# Patient Record
Sex: FEMALE | Race: ASIAN | Employment: UNEMPLOYED | URBAN - METROPOLITAN AREA
[De-identification: names, ages, dates, MRNs, and addresses within clinical notes are randomized per-mention and may not be internally consistent; named-entity substitution may affect disease eponyms.]

---

## 2023-02-21 ENCOUNTER — ANESTHESIA (OUTPATIENT)
Dept: SURGERY | Age: 43
DRG: 153 | End: 2023-02-21

## 2023-02-21 ENCOUNTER — APPOINTMENT (OUTPATIENT)
Dept: CT IMAGING | Age: 43
DRG: 153 | End: 2023-02-21

## 2023-02-21 ENCOUNTER — ANESTHESIA EVENT (OUTPATIENT)
Dept: SURGERY | Age: 43
DRG: 153 | End: 2023-02-21

## 2023-02-21 ENCOUNTER — HOSPITAL ENCOUNTER (INPATIENT)
Age: 43
LOS: 2 days | Discharge: HOME | DRG: 153 | End: 2023-02-23
Attending: EMERGENCY MEDICINE | Admitting: STUDENT IN AN ORGANIZED HEALTH CARE EDUCATION/TRAINING PROGRAM

## 2023-02-21 DIAGNOSIS — H70.002: ICD-10-CM

## 2023-02-21 DIAGNOSIS — H70.012: ICD-10-CM

## 2023-02-21 DIAGNOSIS — H70.002 ACUTE MASTOIDITIS OF LEFT SIDE: Primary | ICD-10-CM

## 2023-02-21 DIAGNOSIS — D50.8 IRON DEFICIENCY ANEMIA SECONDARY TO INADEQUATE DIETARY IRON INTAKE: Chronic | ICD-10-CM

## 2023-02-21 LAB
ALBUMIN SERPL-MCNC: 2.9 G/DL (ref 3.5–5)
ALBUMIN/GLOB SERPL: 0.5 (ref 0.4–1.6)
ALP SERPL-CCNC: 305 U/L (ref 50–130)
ALT SERPL-CCNC: 17 U/L (ref 12–65)
ANION GAP SERPL CALC-SCNC: 7 MMOL/L (ref 2–11)
AST SERPL-CCNC: 18 U/L (ref 15–37)
BILIRUB SERPL-MCNC: 0.3 MG/DL (ref 0.2–1.1)
BUN SERPL-MCNC: 5 MG/DL (ref 6–23)
CALCIUM SERPL-MCNC: 8.5 MG/DL (ref 8.3–10.4)
CHLORIDE SERPL-SCNC: 104 MMOL/L (ref 101–110)
CO2 SERPL-SCNC: 25 MMOL/L (ref 21–32)
CREAT SERPL-MCNC: 0.67 MG/DL (ref 0.6–1)
ERYTHROCYTE [DISTWIDTH] IN BLOOD BY AUTOMATED COUNT: 18.6 % (ref 11.9–14.6)
FERRITIN SERPL-MCNC: 3 NG/ML (ref 8–388)
FOLATE SERPL-MCNC: 3.4 NG/ML (ref 3.1–17.5)
GLOBULIN SER CALC-MCNC: 5.8 G/DL (ref 2.8–4.5)
GLUCOSE SERPL-MCNC: 113 MG/DL (ref 65–100)
HCG SERPL QL: NEGATIVE
HCT VFR BLD AUTO: 24.6 % (ref 35.8–46.3)
HCT VFR BLD AUTO: 30.9 % (ref 35.8–46.3)
HGB BLD-MCNC: 6.7 G/DL (ref 11.7–15.4)
HGB BLD-MCNC: 8.8 G/DL (ref 11.7–15.4)
HISTORY CHECK: NORMAL
IRON SATN MFR SERPL: 4 %
IRON SERPL-MCNC: 16 UG/DL (ref 35–150)
MCH RBC QN AUTO: 18.8 PG (ref 26.1–32.9)
MCHC RBC AUTO-ENTMCNC: 27.2 G/DL (ref 31.4–35)
MCV RBC AUTO: 68.9 FL (ref 82–102)
NRBC # BLD: 0 K/UL (ref 0–0.2)
PLATELET # BLD AUTO: 474 K/UL (ref 150–450)
PMV BLD AUTO: 8.6 FL (ref 9.4–12.3)
POTASSIUM SERPL-SCNC: 3.5 MMOL/L (ref 3.5–5.1)
PROT SERPL-MCNC: 8.7 G/DL (ref 6.3–8.2)
RBC # BLD AUTO: 3.57 M/UL (ref 4.05–5.2)
SODIUM SERPL-SCNC: 136 MMOL/L (ref 133–143)
TIBC SERPL-MCNC: 418 UG/DL (ref 250–450)
VIT B12 SERPL-MCNC: 306 PG/ML (ref 193–986)
WBC # BLD AUTO: 10.7 K/UL (ref 4.3–11.1)

## 2023-02-21 PROCEDURE — P9016 RBC LEUKOCYTES REDUCED: HCPCS

## 2023-02-21 PROCEDURE — 87077 CULTURE AEROBIC IDENTIFY: CPT

## 2023-02-21 PROCEDURE — 6360000004 HC RX CONTRAST MEDICATION: Performed by: EMERGENCY MEDICINE

## 2023-02-21 PROCEDURE — 88304 TISSUE EXAM BY PATHOLOGIST: CPT

## 2023-02-21 PROCEDURE — 6370000000 HC RX 637 (ALT 250 FOR IP): Performed by: OTOLARYNGOLOGY

## 2023-02-21 PROCEDURE — 2500000003 HC RX 250 WO HCPCS: Performed by: OTOLARYNGOLOGY

## 2023-02-21 PROCEDURE — 09B1XZX EXCISION OF LEFT EXTERNAL EAR, EXTERNAL APPROACH, DIAGNOSTIC: ICD-10-PCS | Performed by: OTOLARYNGOLOGY

## 2023-02-21 PROCEDURE — 2580000003 HC RX 258: Performed by: STUDENT IN AN ORGANIZED HEALTH CARE EDUCATION/TRAINING PROGRAM

## 2023-02-21 PROCEDURE — 3700000001 HC ADD 15 MINUTES (ANESTHESIA): Performed by: OTOLARYNGOLOGY

## 2023-02-21 PROCEDURE — 7100000000 HC PACU RECOVERY - FIRST 15 MIN: Performed by: OTOLARYNGOLOGY

## 2023-02-21 PROCEDURE — 2580000003 HC RX 258: Performed by: ANESTHESIOLOGY

## 2023-02-21 PROCEDURE — 88305 TISSUE EXAM BY PATHOLOGIST: CPT

## 2023-02-21 PROCEDURE — 09960ZZ DRAINAGE OF LEFT MIDDLE EAR, OPEN APPROACH: ICD-10-PCS | Performed by: OTOLARYNGOLOGY

## 2023-02-21 PROCEDURE — 82746 ASSAY OF FOLIC ACID SERUM: CPT

## 2023-02-21 PROCEDURE — 69005 DRG XTRNL EAR ABSC/HEM COMP: CPT | Performed by: OTOLARYNGOLOGY

## 2023-02-21 PROCEDURE — 87075 CULTR BACTERIA EXCEPT BLOOD: CPT

## 2023-02-21 PROCEDURE — 85027 COMPLETE CBC AUTOMATED: CPT

## 2023-02-21 PROCEDURE — 99222 1ST HOSP IP/OBS MODERATE 55: CPT | Performed by: OTOLARYNGOLOGY

## 2023-02-21 PROCEDURE — 2580000003 HC RX 258: Performed by: NURSE ANESTHETIST, CERTIFIED REGISTERED

## 2023-02-21 PROCEDURE — 3600000002 HC SURGERY LEVEL 2 BASE: Performed by: OTOLARYNGOLOGY

## 2023-02-21 PROCEDURE — 69420 INCISION OF EARDRUM: CPT | Performed by: OTOLARYNGOLOGY

## 2023-02-21 PROCEDURE — 6360000002 HC RX W HCPCS: Performed by: EMERGENCY MEDICINE

## 2023-02-21 PROCEDURE — 1100000000 HC RM PRIVATE

## 2023-02-21 PROCEDURE — 3700000000 HC ANESTHESIA ATTENDED CARE: Performed by: OTOLARYNGOLOGY

## 2023-02-21 PROCEDURE — 6360000002 HC RX W HCPCS: Performed by: STUDENT IN AN ORGANIZED HEALTH CARE EDUCATION/TRAINING PROGRAM

## 2023-02-21 PROCEDURE — 7100000001 HC PACU RECOVERY - ADDTL 15 MIN: Performed by: OTOLARYNGOLOGY

## 2023-02-21 PROCEDURE — 3600000012 HC SURGERY LEVEL 2 ADDTL 15MIN: Performed by: OTOLARYNGOLOGY

## 2023-02-21 PROCEDURE — 2500000003 HC RX 250 WO HCPCS: Performed by: NURSE ANESTHETIST, CERTIFIED REGISTERED

## 2023-02-21 PROCEDURE — 83550 IRON BINDING TEST: CPT

## 2023-02-21 PROCEDURE — 85018 HEMOGLOBIN: CPT

## 2023-02-21 PROCEDURE — 6360000002 HC RX W HCPCS: Performed by: NURSE ANESTHETIST, CERTIFIED REGISTERED

## 2023-02-21 PROCEDURE — 96366 THER/PROPH/DIAG IV INF ADDON: CPT

## 2023-02-21 PROCEDURE — 87186 SC STD MICRODIL/AGAR DIL: CPT

## 2023-02-21 PROCEDURE — 96375 TX/PRO/DX INJ NEW DRUG ADDON: CPT

## 2023-02-21 PROCEDURE — 2709999900 HC NON-CHARGEABLE SUPPLY: Performed by: OTOLARYNGOLOGY

## 2023-02-21 PROCEDURE — 84703 CHORIONIC GONADOTROPIN ASSAY: CPT

## 2023-02-21 PROCEDURE — 80053 COMPREHEN METABOLIC PANEL: CPT

## 2023-02-21 PROCEDURE — 87205 SMEAR GRAM STAIN: CPT

## 2023-02-21 PROCEDURE — 09910ZZ DRAINAGE OF LEFT EXTERNAL EAR, OPEN APPROACH: ICD-10-PCS | Performed by: OTOLARYNGOLOGY

## 2023-02-21 PROCEDURE — 2580000003 HC RX 258: Performed by: EMERGENCY MEDICINE

## 2023-02-21 PROCEDURE — 30233N1 TRANSFUSION OF NONAUTOLOGOUS RED BLOOD CELLS INTO PERIPHERAL VEIN, PERCUTANEOUS APPROACH: ICD-10-PCS | Performed by: STUDENT IN AN ORGANIZED HEALTH CARE EDUCATION/TRAINING PROGRAM

## 2023-02-21 PROCEDURE — 86923 COMPATIBILITY TEST ELECTRIC: CPT

## 2023-02-21 PROCEDURE — 82607 VITAMIN B-12: CPT

## 2023-02-21 PROCEDURE — 96367 TX/PROPH/DG ADDL SEQ IV INF: CPT

## 2023-02-21 PROCEDURE — 6360000002 HC RX W HCPCS: Performed by: ANESTHESIOLOGY

## 2023-02-21 PROCEDURE — 82728 ASSAY OF FERRITIN: CPT

## 2023-02-21 PROCEDURE — 86901 BLOOD TYPING SEROLOGIC RH(D): CPT

## 2023-02-21 PROCEDURE — 70487 CT MAXILLOFACIAL W/DYE: CPT

## 2023-02-21 PROCEDURE — 96365 THER/PROPH/DIAG IV INF INIT: CPT

## 2023-02-21 PROCEDURE — 99285 EMERGENCY DEPT VISIT HI MDM: CPT

## 2023-02-21 PROCEDURE — 87040 BLOOD CULTURE FOR BACTERIA: CPT

## 2023-02-21 RX ORDER — LIDOCAINE HYDROCHLORIDE AND EPINEPHRINE 10; 10 MG/ML; UG/ML
INJECTION, SOLUTION INFILTRATION; PERINEURAL PRN
Status: DISCONTINUED | OUTPATIENT
Start: 2023-02-21 | End: 2023-02-21 | Stop reason: HOSPADM

## 2023-02-21 RX ORDER — DIPHENHYDRAMINE HYDROCHLORIDE 50 MG/ML
12.5 INJECTION INTRAMUSCULAR; INTRAVENOUS
Status: DISCONTINUED | OUTPATIENT
Start: 2023-02-21 | End: 2023-02-21 | Stop reason: HOSPADM

## 2023-02-21 RX ORDER — POTASSIUM CHLORIDE 7.45 MG/ML
10 INJECTION INTRAVENOUS PRN
Status: DISCONTINUED | OUTPATIENT
Start: 2023-02-21 | End: 2023-02-23 | Stop reason: HOSPADM

## 2023-02-21 RX ORDER — LIDOCAINE HYDROCHLORIDE 10 MG/ML
1 INJECTION, SOLUTION INFILTRATION; PERINEURAL
Status: DISCONTINUED | OUTPATIENT
Start: 2023-02-21 | End: 2023-02-21

## 2023-02-21 RX ORDER — SODIUM CHLORIDE 9 MG/ML
INJECTION, SOLUTION INTRAVENOUS PRN
Status: DISCONTINUED | OUTPATIENT
Start: 2023-02-21 | End: 2023-02-21 | Stop reason: HOSPADM

## 2023-02-21 RX ORDER — DEXAMETHASONE SODIUM PHOSPHATE 10 MG/ML
INJECTION INTRAMUSCULAR; INTRAVENOUS PRN
Status: DISCONTINUED | OUTPATIENT
Start: 2023-02-21 | End: 2023-02-21 | Stop reason: SDUPTHER

## 2023-02-21 RX ORDER — SODIUM CHLORIDE 9 MG/ML
INJECTION, SOLUTION INTRAVENOUS PRN
Status: DISCONTINUED | OUTPATIENT
Start: 2023-02-21 | End: 2023-02-23 | Stop reason: HOSPADM

## 2023-02-21 RX ORDER — PROPOFOL 10 MG/ML
INJECTION, EMULSION INTRAVENOUS PRN
Status: DISCONTINUED | OUTPATIENT
Start: 2023-02-21 | End: 2023-02-21 | Stop reason: SDUPTHER

## 2023-02-21 RX ORDER — POTASSIUM CHLORIDE 20 MEQ/1
40 TABLET, EXTENDED RELEASE ORAL PRN
Status: DISCONTINUED | OUTPATIENT
Start: 2023-02-21 | End: 2023-02-23 | Stop reason: HOSPADM

## 2023-02-21 RX ORDER — ONDANSETRON 4 MG/1
4 TABLET, ORALLY DISINTEGRATING ORAL EVERY 8 HOURS PRN
Status: DISCONTINUED | OUTPATIENT
Start: 2023-02-21 | End: 2023-02-23 | Stop reason: HOSPADM

## 2023-02-21 RX ORDER — MIDAZOLAM HYDROCHLORIDE 1 MG/ML
2 INJECTION INTRAMUSCULAR; INTRAVENOUS
Status: DISCONTINUED | OUTPATIENT
Start: 2023-02-21 | End: 2023-02-21

## 2023-02-21 RX ORDER — SODIUM CHLORIDE 0.9 % (FLUSH) 0.9 %
5-40 SYRINGE (ML) INJECTION PRN
Status: DISCONTINUED | OUTPATIENT
Start: 2023-02-21 | End: 2023-02-23 | Stop reason: HOSPADM

## 2023-02-21 RX ORDER — EPINEPHRINE NASAL SOLUTION 1 MG/ML
SOLUTION NASAL PRN
Status: DISCONTINUED | OUTPATIENT
Start: 2023-02-21 | End: 2023-02-21 | Stop reason: HOSPADM

## 2023-02-21 RX ORDER — ROCURONIUM BROMIDE 10 MG/ML
INJECTION, SOLUTION INTRAVENOUS PRN
Status: DISCONTINUED | OUTPATIENT
Start: 2023-02-21 | End: 2023-02-21 | Stop reason: SDUPTHER

## 2023-02-21 RX ORDER — PROCHLORPERAZINE EDISYLATE 5 MG/ML
5 INJECTION INTRAMUSCULAR; INTRAVENOUS
Status: DISCONTINUED | OUTPATIENT
Start: 2023-02-21 | End: 2023-02-21 | Stop reason: HOSPADM

## 2023-02-21 RX ORDER — 0.9 % SODIUM CHLORIDE 0.9 %
100 INTRAVENOUS SOLUTION INTRAVENOUS
Status: COMPLETED | OUTPATIENT
Start: 2023-02-21 | End: 2023-02-21

## 2023-02-21 RX ORDER — KETOROLAC TROMETHAMINE 15 MG/ML
15 INJECTION, SOLUTION INTRAMUSCULAR; INTRAVENOUS
Status: COMPLETED | OUTPATIENT
Start: 2023-02-21 | End: 2023-02-21

## 2023-02-21 RX ORDER — ONDANSETRON 2 MG/ML
INJECTION INTRAMUSCULAR; INTRAVENOUS PRN
Status: DISCONTINUED | OUTPATIENT
Start: 2023-02-21 | End: 2023-02-21 | Stop reason: SDUPTHER

## 2023-02-21 RX ORDER — ONDANSETRON 2 MG/ML
4 INJECTION INTRAMUSCULAR; INTRAVENOUS
Status: DISCONTINUED | OUTPATIENT
Start: 2023-02-21 | End: 2023-02-21 | Stop reason: HOSPADM

## 2023-02-21 RX ORDER — ACETAMINOPHEN 500 MG
1000 TABLET ORAL ONCE
Status: DISCONTINUED | OUTPATIENT
Start: 2023-02-21 | End: 2023-02-21

## 2023-02-21 RX ORDER — ACETAMINOPHEN 325 MG/1
650 TABLET ORAL EVERY 6 HOURS PRN
Status: DISCONTINUED | OUTPATIENT
Start: 2023-02-21 | End: 2023-02-23 | Stop reason: HOSPADM

## 2023-02-21 RX ORDER — SODIUM CHLORIDE, SODIUM LACTATE, POTASSIUM CHLORIDE, CALCIUM CHLORIDE 600; 310; 30; 20 MG/100ML; MG/100ML; MG/100ML; MG/100ML
INJECTION, SOLUTION INTRAVENOUS CONTINUOUS
Status: DISCONTINUED | OUTPATIENT
Start: 2023-02-21 | End: 2023-02-21

## 2023-02-21 RX ORDER — MORPHINE SULFATE 2 MG/ML
2 INJECTION, SOLUTION INTRAMUSCULAR; INTRAVENOUS EVERY 4 HOURS PRN
Status: DISCONTINUED | OUTPATIENT
Start: 2023-02-21 | End: 2023-02-23 | Stop reason: HOSPADM

## 2023-02-21 RX ORDER — POLYETHYLENE GLYCOL 3350 17 G/17G
17 POWDER, FOR SOLUTION ORAL DAILY PRN
Status: DISCONTINUED | OUTPATIENT
Start: 2023-02-21 | End: 2023-02-23 | Stop reason: HOSPADM

## 2023-02-21 RX ORDER — NEOSTIGMINE METHYLSULFATE 1 MG/ML
INJECTION, SOLUTION INTRAVENOUS PRN
Status: DISCONTINUED | OUTPATIENT
Start: 2023-02-21 | End: 2023-02-21 | Stop reason: SDUPTHER

## 2023-02-21 RX ORDER — ONDANSETRON 2 MG/ML
4 INJECTION INTRAMUSCULAR; INTRAVENOUS EVERY 6 HOURS PRN
Status: DISCONTINUED | OUTPATIENT
Start: 2023-02-21 | End: 2023-02-23 | Stop reason: HOSPADM

## 2023-02-21 RX ORDER — SODIUM CHLORIDE 0.9 % (FLUSH) 0.9 %
10 SYRINGE (ML) INJECTION
Status: COMPLETED | OUTPATIENT
Start: 2023-02-21 | End: 2023-02-21

## 2023-02-21 RX ORDER — SODIUM CHLORIDE 0.9 % (FLUSH) 0.9 %
5-40 SYRINGE (ML) INJECTION PRN
Status: DISCONTINUED | OUTPATIENT
Start: 2023-02-21 | End: 2023-02-21 | Stop reason: HOSPADM

## 2023-02-21 RX ORDER — SODIUM CHLORIDE, SODIUM LACTATE, POTASSIUM CHLORIDE, CALCIUM CHLORIDE 600; 310; 30; 20 MG/100ML; MG/100ML; MG/100ML; MG/100ML
INJECTION, SOLUTION INTRAVENOUS CONTINUOUS
Status: DISCONTINUED | OUTPATIENT
Start: 2023-02-21 | End: 2023-02-23

## 2023-02-21 RX ORDER — DIPHENHYDRAMINE HYDROCHLORIDE 50 MG/ML
12.5 INJECTION INTRAMUSCULAR; INTRAVENOUS EVERY 6 HOURS PRN
Status: DISCONTINUED | OUTPATIENT
Start: 2023-02-21 | End: 2023-02-23 | Stop reason: HOSPADM

## 2023-02-21 RX ORDER — MAGNESIUM SULFATE IN WATER 40 MG/ML
2000 INJECTION, SOLUTION INTRAVENOUS PRN
Status: DISCONTINUED | OUTPATIENT
Start: 2023-02-21 | End: 2023-02-23 | Stop reason: HOSPADM

## 2023-02-21 RX ORDER — OXYCODONE HYDROCHLORIDE 5 MG/1
10 TABLET ORAL PRN
Status: DISCONTINUED | OUTPATIENT
Start: 2023-02-21 | End: 2023-02-21 | Stop reason: HOSPADM

## 2023-02-21 RX ORDER — SODIUM CHLORIDE 0.9 % (FLUSH) 0.9 %
5-40 SYRINGE (ML) INJECTION EVERY 12 HOURS SCHEDULED
Status: DISCONTINUED | OUTPATIENT
Start: 2023-02-21 | End: 2023-02-23 | Stop reason: HOSPADM

## 2023-02-21 RX ORDER — GLYCOPYRROLATE 0.2 MG/ML
INJECTION INTRAMUSCULAR; INTRAVENOUS PRN
Status: DISCONTINUED | OUTPATIENT
Start: 2023-02-21 | End: 2023-02-21 | Stop reason: SDUPTHER

## 2023-02-21 RX ORDER — SODIUM CHLORIDE 0.9 % (FLUSH) 0.9 %
5-40 SYRINGE (ML) INJECTION PRN
Status: DISCONTINUED | OUTPATIENT
Start: 2023-02-21 | End: 2023-02-21

## 2023-02-21 RX ORDER — FENTANYL CITRATE 50 UG/ML
INJECTION, SOLUTION INTRAMUSCULAR; INTRAVENOUS PRN
Status: DISCONTINUED | OUTPATIENT
Start: 2023-02-21 | End: 2023-02-21 | Stop reason: SDUPTHER

## 2023-02-21 RX ORDER — SODIUM CHLORIDE 0.9 % (FLUSH) 0.9 %
5-40 SYRINGE (ML) INJECTION EVERY 12 HOURS SCHEDULED
Status: DISCONTINUED | OUTPATIENT
Start: 2023-02-21 | End: 2023-02-21 | Stop reason: HOSPADM

## 2023-02-21 RX ORDER — OXYCODONE HYDROCHLORIDE 5 MG/1
5 TABLET ORAL PRN
Status: DISCONTINUED | OUTPATIENT
Start: 2023-02-21 | End: 2023-02-21 | Stop reason: HOSPADM

## 2023-02-21 RX ORDER — ACETAMINOPHEN 650 MG/1
650 SUPPOSITORY RECTAL EVERY 6 HOURS PRN
Status: DISCONTINUED | OUTPATIENT
Start: 2023-02-21 | End: 2023-02-23 | Stop reason: HOSPADM

## 2023-02-21 RX ORDER — LIDOCAINE HYDROCHLORIDE 20 MG/ML
INJECTION, SOLUTION EPIDURAL; INFILTRATION; INTRACAUDAL; PERINEURAL PRN
Status: DISCONTINUED | OUTPATIENT
Start: 2023-02-21 | End: 2023-02-21 | Stop reason: SDUPTHER

## 2023-02-21 RX ORDER — SODIUM CHLORIDE, SODIUM LACTATE, POTASSIUM CHLORIDE, CALCIUM CHLORIDE 600; 310; 30; 20 MG/100ML; MG/100ML; MG/100ML; MG/100ML
INJECTION, SOLUTION INTRAVENOUS CONTINUOUS PRN
Status: DISCONTINUED | OUTPATIENT
Start: 2023-02-21 | End: 2023-02-21 | Stop reason: SDUPTHER

## 2023-02-21 RX ORDER — SODIUM CHLORIDE 9 MG/ML
INJECTION, SOLUTION INTRAVENOUS PRN
Status: DISCONTINUED | OUTPATIENT
Start: 2023-02-21 | End: 2023-02-21

## 2023-02-21 RX ORDER — SODIUM CHLORIDE 0.9 % (FLUSH) 0.9 %
5-40 SYRINGE (ML) INJECTION EVERY 12 HOURS SCHEDULED
Status: DISCONTINUED | OUTPATIENT
Start: 2023-02-21 | End: 2023-02-21

## 2023-02-21 RX ADMIN — DEXAMETHASONE SODIUM PHOSPHATE 5 MG: 10 INJECTION INTRAMUSCULAR; INTRAVENOUS at 17:27

## 2023-02-21 RX ADMIN — SODIUM CHLORIDE, PRESERVATIVE FREE 10 ML: 5 INJECTION INTRAVENOUS at 09:11

## 2023-02-21 RX ADMIN — ONDANSETRON 4 MG: 2 INJECTION INTRAMUSCULAR; INTRAVENOUS at 17:27

## 2023-02-21 RX ADMIN — FENTANYL CITRATE 50 MCG: 50 INJECTION, SOLUTION INTRAMUSCULAR; INTRAVENOUS at 17:33

## 2023-02-21 RX ADMIN — KETOROLAC TROMETHAMINE 15 MG: 15 INJECTION, SOLUTION INTRAMUSCULAR; INTRAVENOUS at 07:34

## 2023-02-21 RX ADMIN — VANCOMYCIN HYDROCHLORIDE 1500 MG: 10 INJECTION, POWDER, LYOPHILIZED, FOR SOLUTION INTRAVENOUS at 08:43

## 2023-02-21 RX ADMIN — GLYCOPYRROLATE 0.4 MG: 0.2 INJECTION INTRAMUSCULAR; INTRAVENOUS at 18:11

## 2023-02-21 RX ADMIN — ROCURONIUM BROMIDE 35 MG: 50 INJECTION, SOLUTION INTRAVENOUS at 17:10

## 2023-02-21 RX ADMIN — SODIUM CHLORIDE 100 ML: 9 INJECTION, SOLUTION INTRAVENOUS at 09:11

## 2023-02-21 RX ADMIN — FENTANYL CITRATE 50 MCG: 50 INJECTION, SOLUTION INTRAMUSCULAR; INTRAVENOUS at 17:10

## 2023-02-21 RX ADMIN — VANCOMYCIN HYDROCHLORIDE 1000 MG: 1 INJECTION, POWDER, LYOPHILIZED, FOR SOLUTION INTRAVENOUS at 21:36

## 2023-02-21 RX ADMIN — PIPERACILLIN AND TAZOBACTAM 4500 MG: 4; .5 INJECTION, POWDER, FOR SOLUTION INTRAVENOUS at 07:44

## 2023-02-21 RX ADMIN — HYDROMORPHONE HYDROCHLORIDE 0.5 MG: 1 INJECTION, SOLUTION INTRAMUSCULAR; INTRAVENOUS; SUBCUTANEOUS at 18:56

## 2023-02-21 RX ADMIN — SODIUM CHLORIDE, PRESERVATIVE FREE 10 ML: 5 INJECTION INTRAVENOUS at 21:42

## 2023-02-21 RX ADMIN — PROPOFOL 200 MG: 10 INJECTION, EMULSION INTRAVENOUS at 17:10

## 2023-02-21 RX ADMIN — SODIUM CHLORIDE, SODIUM LACTATE, POTASSIUM CHLORIDE, AND CALCIUM CHLORIDE: 600; 310; 30; 20 INJECTION, SOLUTION INTRAVENOUS at 17:05

## 2023-02-21 RX ADMIN — LIDOCAINE HYDROCHLORIDE 80 MG: 20 INJECTION, SOLUTION EPIDURAL; INFILTRATION; INTRACAUDAL; PERINEURAL at 17:10

## 2023-02-21 RX ADMIN — ROCURONIUM BROMIDE 5 MG: 50 INJECTION, SOLUTION INTRAVENOUS at 17:33

## 2023-02-21 RX ADMIN — IOPAMIDOL 100 ML: 755 INJECTION, SOLUTION INTRAVENOUS at 09:11

## 2023-02-21 RX ADMIN — MORPHINE SULFATE 2 MG: 2 INJECTION, SOLUTION INTRAMUSCULAR; INTRAVENOUS at 12:30

## 2023-02-21 RX ADMIN — SODIUM CHLORIDE, POTASSIUM CHLORIDE, SODIUM LACTATE AND CALCIUM CHLORIDE: 600; 310; 30; 20 INJECTION, SOLUTION INTRAVENOUS at 21:42

## 2023-02-21 RX ADMIN — Medication 3 MG: at 18:11

## 2023-02-21 RX ADMIN — CEFEPIME 2000 MG: 2 INJECTION, POWDER, FOR SOLUTION INTRAVENOUS at 12:08

## 2023-02-21 ASSESSMENT — PAIN SCALES - GENERAL
PAINLEVEL_OUTOF10: 0
PAINLEVEL_OUTOF10: 5
PAINLEVEL_OUTOF10: 0
PAINLEVEL_OUTOF10: 4
PAINLEVEL_OUTOF10: 0
PAINLEVEL_OUTOF10: 8
PAINLEVEL_OUTOF10: 8
PAINLEVEL_OUTOF10: 0
PAINLEVEL_OUTOF10: 0
PAINLEVEL_OUTOF10: 8
PAINLEVEL_OUTOF10: 0

## 2023-02-21 ASSESSMENT — ENCOUNTER SYMPTOMS
VOMITING: 0
RHINORRHEA: 0
NAUSEA: 0
ABDOMINAL PAIN: 0
SHORTNESS OF BREATH: 0
COLOR CHANGE: 1
COUGH: 0
SORE THROAT: 0

## 2023-02-21 ASSESSMENT — PAIN DESCRIPTION - LOCATION
LOCATION: EAR
LOCATION: EAR;INCISION

## 2023-02-21 ASSESSMENT — LIFESTYLE VARIABLES: SMOKING_STATUS: 0

## 2023-02-21 ASSESSMENT — PAIN DESCRIPTION - ORIENTATION: ORIENTATION: LEFT

## 2023-02-21 ASSESSMENT — PAIN - FUNCTIONAL ASSESSMENT: PAIN_FUNCTIONAL_ASSESSMENT: 0-10

## 2023-02-21 NOTE — BRIEF OP NOTE
Brief Postoperative Note      Patient: Akila Fields  YOB: 1980  MRN: [id number]    Date of Procedure: 2/21/2023    Pre-Op Diagnosis: Abcess of the left mastoid, L OME    Post-Op Diagnosis: Same       Procedure(s):  INCISION AND DRAINAGE LEFT MASTOID ABCESS/ POSS MYRINGOTOMY TUBES    Surgeon(s):  Lucien Ferro MD    Assistant:  * No surgical staff found *    Anesthesia: General    Estimated Blood Loss (mL): Minimal    Complications: None    Specimens:   ID Type Source Tests Collected by Time Destination   1 : left ear abscess Swab Ear CULTURE, ANAEROBIC AND AEROBIC Lucien Ferro MD 2/21/2023 1739    A : left ear mass Tissue Ear SURGICAL PATHOLOGY Lucien Ferro MD 2/21/2023 1742        Implants:  * No implants in log *      Drains:   Open Drain 02/21/23 Left (Active)       Findings: thickened purulence within subperiosteal abscess of L mastoid bone, thick ISMAEL w/ inflamed TM- EAC packed w/ Gelfoam    Electronically signed by Lucien Ferro MD on 2/21/2023 at 6:22 PM

## 2023-02-21 NOTE — H&P
Hospitalist History and Physical   Admit Date:  2023  6:50 AM   Name:  Heber Farfan   Age:  43 y.o. Sex:  female  :  1980   MRN:  [id number]   Room:  [facility]    Presenting Complaint: Otalgia     Reason(s) for Admission: Subperiosteal abscess of left mastoid [H70.012]  Acute mastoiditis of left side [H70.002]     History of Present Illness:   Heber Farfan is a 43 y.o. female with medical history of anemia who presented with left ear pain and swelling of her scalp. Patient states her symptoms started about 1 week ago. Denied any upper respiratory symptoms. Denied any drainage from that ear. Denied any cardiac chest pain, shortness of breath, abdominal pain, fever/chills. Patient stated that pain is sharp and without radiation but behind the left ear. Patient speaks English as her second language. CT scan with evidence of left mastoiditis. ENT made aware in the ED. Patient was made NPO. Patient also found to have hemoglobin at 6.7. Patient also with iron deficiency anemia with low iron and ferritin levels. Patient will receive 1 unit packed red blood cell. Assessment & Plan:   Acute mastoiditis of left side  -ENT consulted  - Continue to keep n.p.o.  - Analgesics as needed  - Antibiotics, Vanco plus cefepime  - Patient expected to go to the OR today   - Follow-up ENT recommendations  - CT maxillofacial with acute mastoiditis and subperiosteal abscess    Iron deficiency anemia  - Hemoglobin is 6.7  - Iron and ferritin levels low  - History of transfusions 9 years ago per patient  - Patient would benefit from hematology appointment in outpatient setting  - Status post 1 unit packed red blood cell  - Follow-up daily CBC      PT/OT evals and PPD needed/ordered? Yes    Diet: Diet NPO  VTE prophylaxis: SCD's   Code status: No Order    Hospital Problems:  Principal Problem:    Acute mastoiditis of left side  Resolved Problems:    * No resolved hospital problems.  *       Past History: History reviewed. No pertinent past medical history. Past Surgical History:   Procedure Laterality Date     SECTION      TUBAL LIGATION          Social History     Tobacco Use    Smoking status: Not on file    Smokeless tobacco: Not on file   Substance Use Topics    Alcohol use: Not on file      Social History     Substance and Sexual Activity   Drug Use Not on file       History reviewed. No pertinent family history. There is no immunization history on file for this patient. No Known Allergies  Prior to Admit Medications:  No current outpatient medications      Objective:   Patient Vitals for the past 24 hrs:   Temp Pulse Resp BP SpO2   23 1500 -- -- -- 100/76 100 %   23 1459 98.6 °F (37 °C) 78 16 98/77 97 %   23 1457 -- -- -- 98/77 100 %   23 1400 -- -- -- 96/67 99 %   23 1247 97.9 °F (36.6 °C) 75 16 104/73 100 %   23 1227 98.2 °F (36.8 °C) 72 16 103/72 100 %   23 1200 -- -- -- 108/76 100 %   23 1100 -- -- -- 99/68 100 %   23 1001 -- -- -- 92/71 98 %   23 0648 98.5 °F (36.9 °C) 90 20 (!) 105/56 100 %       Oxygen Therapy  SpO2: 100 %    Estimated body mass index is 25.78 kg/m² as calculated from the following:    Height as of this encounter: 5' (1.524 m). Weight as of this encounter: 132 lb (59.9 kg). No intake or output data in the 24 hours ending 23 1720      Physical Exam:  Blood pressure 100/76, pulse 78, temperature 98.6 °F (37 °C), resp. rate 16, height 5' (1.524 m), weight 132 lb (59.9 kg), SpO2 100 %. General:    Well nourished. Head:  Normocephalic, atraumatic. Left ear slightly swollen but not tender. Mastoid areas are tender  Eyes:  Sclerae appear normal.  Pupils equally round. ENT:  Nares appear normal.  Moist oral mucosa  Neck:  No restricted ROM. Trachea midline   CV:   RRR. No m/r/g. No jugular venous distension. Lungs:   CTAB. No wheezing, rhonchi, or rales. Symmetric expansion.   Abdomen: Soft, nontender, nondistended. Extremities: No cyanosis or clubbing. No edema  Skin:     No rashes and normal coloration. Warm and dry. Neuro:  CN II-XII grossly intact. Sensation intact. Psych:  Normal mood and affect.       I have personally reviewed labs and tests:  Recent Labs:  Recent Results (from the past 24 hour(s))   CBC    Collection Time: 02/21/23  7:33 AM   Result Value Ref Range    WBC 10.7 4.3 - 11.1 K/uL    RBC 3.57 (L) 4.05 - 5.2 M/uL    Hemoglobin 6.7 (LL) 11.7 - 15.4 g/dL    Hematocrit 24.6 (L) 35.8 - 46.3 %    MCV 68.9 (L) 82.0 - 102.0 FL    MCH 18.8 (L) 26.1 - 32.9 PG    MCHC 27.2 (L) 31.4 - 35.0 g/dL    RDW 18.6 (H) 11.9 - 14.6 %    Platelets 126 (H) 263 - 450 K/uL    MPV 8.6 (L) 9.4 - 12.3 FL    nRBC 0.00 0.0 - 0.2 K/uL   Comprehensive Metabolic Panel    Collection Time: 02/21/23  7:33 AM   Result Value Ref Range    Sodium 136 133 - 143 mmol/L    Potassium 3.5 3.5 - 5.1 mmol/L    Chloride 104 101 - 110 mmol/L    CO2 25 21 - 32 mmol/L    Anion Gap 7 2 - 11 mmol/L    Glucose 113 (H) 65 - 100 mg/dL    BUN 5 (L) 6 - 23 MG/DL    Creatinine 0.67 0.6 - 1.0 MG/DL    Est, Glom Filt Rate >60 >60 ml/min/1.73m2    Calcium 8.5 8.3 - 10.4 MG/DL    Total Bilirubin 0.3 0.2 - 1.1 MG/DL    ALT 17 12 - 65 U/L    AST 18 15 - 37 U/L    Alk Phosphatase 305 (H) 50 - 130 U/L    Total Protein 8.7 (H) 6.3 - 8.2 g/dL    Albumin 2.9 (L) 3.5 - 5.0 g/dL    Globulin 5.8 (H) 2.8 - 4.5 g/dL    Albumin/Globulin Ratio 0.5 0.4 - 1.6     Ferritin    Collection Time: 02/21/23  7:33 AM   Result Value Ref Range    Ferritin 3 (L) 8 - 388 NG/ML   Folate    Collection Time: 02/21/23  7:33 AM   Result Value Ref Range    Folate 3.4 3.1 - 17.5 ng/mL   Vitamin B12    Collection Time: 02/21/23  7:33 AM   Result Value Ref Range    Vitamin B-12 306 193 - 986 pg/mL   Transferrin Saturation    Collection Time: 02/21/23  7:33 AM   Result Value Ref Range    Iron 16 (L) 35 - 150 ug/dL    TIBC 418 250 - 450 ug/dL    TRANSFERRIN SATURATION 4 %   TYPE AND SCREEN    Collection Time: 02/21/23  8:21 AM   Result Value Ref Range    Crossmatch expiration date 02/24/2023,2359     ABO/Rh O POSITIVE     Antibody Screen NEG     Blood Bank Comment       VINCE NOTIFIED IN ER THAT UNIT IS READY 2/21/23 @ 11:54 BY ALINA    Unit Number [id number]     Product Code Blood Bank RC LR     Unit Divison 00     Dispense Status Blood Bank ISSUED     Crossmatch Result Compatible    PREPARE RBC (CROSSMATCH), 1 Units    Collection Time: 02/21/23 11:45 AM   Result Value Ref Range    History Check Historical check performed    Hemoglobin and Hematocrit    Collection Time: 02/21/23  4:17 PM   Result Value Ref Range    Hemoglobin 8.8 (L) 11.7 - 15.4 g/dL    Hematocrit 30.9 (L) 35.8 - 46.3 %       I have personally reviewed imaging studies:  CT MAXILLOFACIAL W CONTRAST    Result Date: 2/21/2023  EXAMINATION: CT MAXILLOFACIAL W CONTRAST 2/21/2023 9:19 AM ACCESSION NUMBER: [id number] COMPARISON: None available INDICATION: left ear/facial pain-abscess? Mastoiditis? TECHNIQUE: Dedicated contrast-enhanced CT of the face was performed with axial images. Reformats are provided. 100mL of Isovue-370 was administered intravenously without complication. Radiation dose reduction techniques were used for this study. Our CT scanners use one or all of the following: Automated exposure control, adjustment of the mA and/or kV according to patient size, iterative reconstruction. FINDINGS: BONES: No acute fracture. There is fluid within the left mastoid air cells. 1.9 x 0.5 cm subperiosteal collection along the left mastoid bone. SINUSES: Paranasal sinuses are clear. ORBITS: No evidence of globe hemorrhage or lens dislocation. No radiopaque foreign body. VISUALIZED BRAIN: No visualized hemorrhage, mass effect, or herniation.  SOFT TISSUES: Soft tissue stranding along the left mastoid region and posterior to the ear canal.     1.  CT findings suggestive of left mastoiditis with subperiosteal abscess along the outer table of the left mastoid bone. Soft tissue swelling along the left mastoid region. 2.  No intracranial findings are evident. Echocardiogram:  No results found for this or any previous visit. Orders Placed This Encounter   Medications    ketorolac (TORADOL) injection 15 mg    piperacillin-tazobactam (ZOSYN) 4,500 mg in sodium chloride 0.9 % 100 mL IVPB (mini-bag)     Order Specific Question:   Antimicrobial Indications     Answer: Other     Order Specific Question:   Other Abx Indication     Answer: Suspected Sepsis of Skin or Soft Tissue Origin    vancomycin (VANCOCIN) 1500 mg in sodium chloride 0.9% 500 mL IVPB     Order Specific Question:   Antimicrobial Indications     Answer: Other     Order Specific Question:   Other Abx Indication     Answer:    Suspected Sepsis of Skin or Soft Tissue Origin    iopamidol (ISOVUE-370) 76 % injection 100 mL    sodium chloride flush 0.9 % injection 10 mL    0.9 % sodium chloride bolus    0.9 % sodium chloride infusion    diphenhydrAMINE (BENADRYL) injection 12.5 mg    cefepime (MAXIPIME) 2,000 mg in sodium chloride 0.9 % 50 mL IVPB (mini-bag)     Order Specific Question:   Antimicrobial Indications     Answer:   Skin and Soft Tissue Infection     Order Specific Question:   Skin duration of therapy     Answer:   5 days    morphine injection 2 mg    cefepime (MAXIPIME) 2,000 mg in sodium chloride 0.9 % 50 mL IVPB (mini-bag)     Order Specific Question:   Antimicrobial Indications     Answer:   Skin and Soft Tissue Infection     Order Specific Question:   Skin duration of therapy     Answer:   5 days    vancomycin (VANCOCIN) 1,000 mg in sodium chloride 0.9 % 250 mL IVPB ([id number])     Order Specific Question:   Antimicrobial Indications     Answer:   Skin and Soft Tissue Infection     Order Specific Question:   Skin duration of therapy     Answer:   5 days         Signed:  Kay Ram MD    Part of this note may have been written by using a voice dictation software. The note has been proof read but may still contain some grammatical/other typographical errors.

## 2023-02-21 NOTE — ED NOTES
TRANSFER - OUT REPORT:    Verbal report given to Jordi Dee  on Gema Raymond  being transferred to preop for routine progression of patient care       Report consisted of patient's Situation, Background, Assessment and   Recommendations(SBAR). Information from the following report(s) Nurse Handoff Report, ED Encounter Summary, ED SBAR and [facility] was reviewed with the receiving nurse. Lines:   Peripheral IV 02/21/23 Right Wrist (Active)   Site Assessment Clean, dry & intact 02/21/23 0732   Line Status Blood return noted 02/21/23 0732   Phlebitis Assessment No symptoms 02/21/23 0732   Infiltration Assessment 0 02/21/23 0732       Peripheral IV 02/21/23 Left Hand (Active)        Opportunity for questions and clarification was provided.       Patient transported with:  Registered Nurse     Bienvenido Crenshaw RN  02/21/23 2597

## 2023-02-21 NOTE — CARE COORDINATION
Heriberto Contreras is a 43 y.o. female who presents to the Emergency Department with chief complaint of        Chief Complaint   Patient presents with    Otalgia      70-year-old female presents with 1 week of left ear pain now with some swelling in that area of her scalp especially behind the ear for the last few days. She denies any recent or current runny nose congestion cough or sore throat. She denies any drainage from her ear. No fevers or chills. She denies any past medical history. Patient denies diabetes. Pain is sharp and without radiation and is mostly behind her left ear but some above and anterior to the ear as well. The history is provided by the patient. Review of Systems   Constitutional:  Negative for chills and fever. HENT:  Negative for congestion, ear discharge, hearing loss, rhinorrhea and sore throat. Eyes:  Negative for visual disturbance. Respiratory:  Negative for cough and shortness of breath. Gastrointestinal:  Negative for abdominal pain, nausea and vomiting. Skin:  Positive for color change. Neurological:  Positive for headaches. Negative for weakness and numbness. Heriberto Contreras is a 43 y.o. female who presents to the Emergency Department with chief complaint of        Chief Complaint   Patient presents with    Otalgia      70-year-old female presents with 1 week of left ear pain now with some swelling in that area of her scalp especially behind the ear for the last few days. She denies any recent or current runny nose congestion cough or sore throat. She denies any drainage from her ear. No fevers or chills. She denies any past medical history. Patient denies diabetes. Pain is sharp and without radiation and is mostly behind her left ear but some above and anterior to the ear as well. The history is provided by the patient. Review of Systems   Constitutional:  Negative for chills and fever.    HENT:  Negative for congestion, ear discharge, hearing loss, rhinorrhea and sore throat. Eyes:  Negative for visual disturbance. Respiratory:  Negative for cough and shortness of breath. Gastrointestinal:  Negative for abdominal pain, nausea and vomiting. Skin:  Positive for color change. Neurological:  Positive for headaches. Negative for weakness and numbness. 02/21/23 1456   Service Assessment   Patient Orientation Alert and Oriented   Cognition Alert   History Provided By Significant Other;Patient  (PT/family primary Diana Melgar is [place], but they speak very clear English and verbalized understanding)   Primary Caregiver Self   Accompanied By/Relationship spouse/[facility] #[phone]   Support Systems Spouse/Significant Other   [address] [facility] is: Legal Next of Nai Hudson   PCP Verified by CM No  (Info given to [facility] and [facility])   Prior Functional Level Independent in ADLs/IADLs   Current Functional Level Independent in ADLs/IADLs   Can patient return to prior living arrangement Yes   Ability to make needs known: Good   Family able to assist with home care needs: Yes   Would you like for me to discuss the discharge plan with any other family members/significant others, and if so, who? No   Financial Resources None   [employer] None   Social/Functional History   Lives With Spouse   Active  Yes   Occupation Unemployed   Discharge Planning   Living Arrangements Spouse/Significant Other   Pt and spouse at bedside, both providing information. Spouse/[facility] #[phone] is ER Contact. I confirmed no PCP/medically insurance. Spouse has BCBC and sees a DAKOTA MD, but they are recently  and he has yet to add her under his plan. Info given for [facility], [facility] free clinic and [facility]. Pt denies any other needs. Per MD pt may be medically ready for d/c on 2/23. CM to follow.

## 2023-02-21 NOTE — PROGRESS NOTES
VANCO DAILY FOLLOW UP NOTE  [facility] Pharmacokinetic Monitoring Service - Vancomycin    Consulting Provider: Dr Amanda Cota   Indication: SSTI  Target Concentration: Goal AUC/LUIS 400-600 mg*hr/L  Day of Therapy: 1  Additional Antimicrobials: Cefepime    Patient eligible for piperacillin-tazobactam to cefepime auto-substitution per P&T approved protocol? YES    Pertinent Laboratory Values: Wt Readings from Last 1 Encounters:   02/21/23 132 lb (59.9 kg)     Temp Readings from Last 1 Encounters:   02/21/23 97.9 °F (36.6 °C) (Oral)     Recent Labs     02/21/23  0733   BUN 5*   CREATININE 0.67   WBC 10.7     Estimated Creatinine Clearance: 89 mL/min (based on SCr of 0.67 mg/dL). No results found for: Perez Car    MRSA Nasal Swab: N/A.  Non-respiratory infection      Plan:  Dosing recommendations based on Bayesian software  Start vancomycin 1500 mg x 1 dose, then 1 gm q12h  Anticipated AUC of 498 and trough concentration of 14.5 at steady state  Renal labs as indicated   Vancomycin concentration ordered for 2/22 @ 0600    Pharmacy will continue to monitor patient and adjust therapy as indicated      Thank you for the consult,    Robyn Samson, PharmD

## 2023-02-21 NOTE — ANESTHESIA PRE PROCEDURE
Department of Anesthesiology  Preprocedure Note       Name:  Humaira Chi   Age:  43 y.o.  :  1980                                          MRN:  [id number]         Date:  2023      Surgeon: Isael Wheeler):  Perla Silva MD    Procedure: Procedure(s):  INCISION AND DRAINAGE LEFT MASTOID ABCESS/ POSS MYRINGOTOMY TUBES    Medications prior to admission:   Prior to Admission medications    Not on File       Current medications:    Current Facility-Administered Medications   Medication Dose Route Frequency Provider Last Rate Last Admin    0.9 % sodium chloride infusion   IntraVENous PRN Paulina MD Garrett        diphenhydrAMINE (BENADRYL) injection 12.5 mg  12.5 mg IntraVENous Q6H PRN Paulina Tucker MD        [START ON 2023] cefepime (MAXIPIME) 2,000 mg in sodium chloride 0.9 % 50 mL IVPB (mini-bag)  2,000 mg IntraVENous Q12H Paulina MD Garrett        morphine injection 2 mg  2 mg IntraVENous Q4H PRN Paulina MD Garrett   2 mg at 23 1230    vancomycin (VANCOCIN) 1,000 mg in sodium chloride 0.9 % 250 mL IVPB ([id number])  1,000 mg IntraVENous Q12H Paulina MD Garrett         No current outpatient medications on file. Allergies:  No Known Allergies    Problem List:    Patient Active Problem List   Diagnosis Code    Acute mastoiditis of left side H70.002       Past Medical History:  No past medical history on file. Past Surgical History:  No past surgical history on file.     Social History:    Social History     Tobacco Use    Smoking status: Not on file    Smokeless tobacco: Not on file   Substance Use Topics    Alcohol use: Not on file                                Counseling given: Not Answered      Vital Signs (Current):   Vitals:    23 1400 23 1457 23 1459 23 1500   BP: 96/67 98/77 98/77 100/76   Pulse:   78    Resp:   16    Temp:   98.6 °F (37 °C)    TempSrc:       SpO2: 99% 100% 97% 100%   Weight:       Height: BP Readings from Last 3 Encounters:   02/21/23 100/76       NPO Status:                                                                                 BMI:   Wt Readings from Last 3 Encounters:   02/21/23 132 lb (59.9 kg)     Body mass index is 25.78 kg/m². CBC:   Lab Results   Component Value Date/Time    WBC 10.7 02/21/2023 07:33 AM    RBC 3.57 02/21/2023 07:33 AM    HGB 6.7 02/21/2023 07:33 AM    HCT 24.6 02/21/2023 07:33 AM    MCV 68.9 02/21/2023 07:33 AM    RDW 18.6 02/21/2023 07:33 AM     02/21/2023 07:33 AM       CMP:   Lab Results   Component Value Date/Time     02/21/2023 07:33 AM    K 3.5 02/21/2023 07:33 AM     02/21/2023 07:33 AM    CO2 25 02/21/2023 07:33 AM    BUN 5 02/21/2023 07:33 AM    CREATININE 0.67 02/21/2023 07:33 AM    LABGLOM >60 02/21/2023 07:33 AM    GLUCOSE 113 02/21/2023 07:33 AM    PROT 8.7 02/21/2023 07:33 AM    CALCIUM 8.5 02/21/2023 07:33 AM    BILITOT 0.3 02/21/2023 07:33 AM    ALKPHOS 305 02/21/2023 07:33 AM    AST 18 02/21/2023 07:33 AM    ALT 17 02/21/2023 07:33 AM       POC Tests: No results for input(s): POCGLU, POCNA, POCK, POCCL, POCBUN, POCHEMO, POCHCT in the last 72 hours.     Coags: No results found for: PROTIME, INR, APTT    HCG (If Applicable): No results found for: PREGTESTUR, PREGSERUM, HCG, HCGQUANT     ABGs: No results found for: PHART, PO2ART, ZHT2BAA, TSL4KQJ, BEART, K7GXYYLY     Type & Screen (If Applicable):  No results found for: LABABO, LABRH    Drug/Infectious Status (If Applicable):  No results found for: HIV, HEPCAB    COVID-19 Screening (If Applicable): No results found for: COVID19        Anesthesia Evaluation  Patient summary reviewed and Nursing notes reviewed no history of anesthetic complications:   Airway: Mallampati: II  TM distance: >3 FB   Neck ROM: full  Mouth opening: > = 3 FB   Dental: normal exam         Pulmonary:Negative Pulmonary ROS and normal exam        (-) not a current smoker Cardiovascular:  Exercise tolerance: good (>4 METS),           Rhythm: regular  Rate: normal                    Neuro/Psych:   Negative Neuro/Psych ROS              GI/Hepatic/Renal:             Endo/Other:    (+) blood dyscrasia: anemia:., .                 Abdominal:             Vascular: negative vascular ROS. Other Findings:           Anesthesia Plan      general     ASA 3 - emergent       Induction: intravenous. MIPS: Postoperative opioids intended and Prophylactic antiemetics administered. Anesthetic plan and risks discussed with patient. Use of blood products discussed with patient whom consented to blood products.                      Kaiser Geronimo MD   2/21/2023

## 2023-02-21 NOTE — CONSULTS
c: L mastoid swelling    HPI: 42 yo female seen in consult at [facility] ED. She p/w worsening L sided otalgia for past wk. She noted more swelling post-auricularly and HL and came into ED. CT scan revealed L OM w/ sub-periosteal abscess overlying mastoid bone. She has no previous otologic hx. No otorrhea or bleeding. No sxs on R side. She was found to be anemic and is getting blood in the ED    PMH: none    PSurgHx: none    SH: non-smoker    FH: N/C    No Known Allergies    HOME MEDS: none    /73   Pulse 75   Temp 97.9 °F (36.6 °C) (Oral)   Resp 16   Ht 5' (1.524 m)   Wt 132 lb (59.9 kg)   SpO2 100%   BMI 25.78 kg/m²     General: NAD, well-appearing  Neuro: No gross neuro deficits. CN's II-XII intact. No facial weakness. Eyes: EOMI. Pupils reactive. No periorbital edema/ecchymosis. Skin: No facial erythema, rashes or concerning lesions. Nose: No external deviations or saddling. Intranasally, septum is midline without perforations, nasal mucosa appears healthy with no erythema, mucopurulence, or polyps. Mouth: Moist mucus membranes, normal tongue/palate mobility, midline uvula, no concerning mucosal lesions. Oropharynx clear with no erythema/exudate, no tonsillar hypertrophy. Ears: L post-auricular edema w/ palpable abscess- EAC w/ significant swelling, cannot see TM at all. Normal R ear exam.  Neck: Soft, supple, no palpable lateral neck masses. No parotid or submandibular masses. No thyromegaly or palpable thyroid nodules. No surgical scars. Lymphatics: No palpable cervical LAD. Resp: No audible stridor or wheezing. CV: No JVD, no murmurs. Extremities: No clubbing or cyanosis. IMAGING:  CT face-  FINDINGS:   BONES: No acute fracture. There is fluid within the left mastoid air cells. 1.9   x 0.5 cm subperiosteal collection along the left mastoid bone. SINUSES: Paranasal sinuses are clear. ORBITS: No evidence of globe hemorrhage or lens dislocation. No radiopaque   foreign body. VISUALIZED BRAIN: No visualized hemorrhage, mass effect, or herniation. SOFT TISSUES: Soft tissue stranding along the left mastoid region and posterior   to the ear canal.               Impression   1. CT findings suggestive of left mastoiditis with subperiosteal abscess along   the outer table of the left mastoid bone. Soft tissue swelling along the left   mastoid region. 2.  No intracranial findings are evident     A/P:  She has L sided OM w/ a small subperiosteal abscess overlying L mastoid bone. I recommend taking her back to OR this evening for I&D of L mastoid abscess and poss L ear tube. I reviewed risks of surgery and she would like to proceed. Will need inpatient admission for IV abx and wound care- I will leave pressure dressing and drain in place.      MD Melly Martinez MD

## 2023-02-21 NOTE — CONSENT
Informed Consent for Blood Component Transfusion Note    I have discussed with the patient the rationale for blood component transfusion; its benefits in treating or preventing fatigue, organ damage, or death; and its risk which includes mild transfusion reactions, rare risk of blood borne infection, or more serious but rare reactions. I have discussed the alternatives to transfusion, including the risk and consequences of not receiving transfusion. The patient had an opportunity to ask questions and had agreed to proceed with transfusion of blood components.     Electronically signed by Sarah Navarrete MD on 2/21/23 at 11:44 AM EST

## 2023-02-21 NOTE — ED PROVIDER NOTES
Emergency Department Provider Note                   PCP:                None Provider               Age: 43 y.o. Sex: female       ICD-10-CM    1. Acute mastoiditis of left side  H70.002       2. Subperiosteal abscess of left mastoid  H70.012           DISPOSITION Decision To Admit 02/21/2023 11:02:00 AM        Medical Decision Making  Exam concerning for mastoiditis or a cellulitis of the left ear and scalp tissue. No sign of sepsis initially but we will obtain blood cultures and provide antibiotics as we anticipate need for CT scan imaging to evaluate for mastoiditis. No headaches and no meningiomas suggestive of intracranial abscess/dural abscess. Clinically no sign of meningitis. Patient does not appear significantly or seriously ill or toxic at this time. 10:07 AM'  The CT is left mastoiditis as suspected clinically. Patient's hemoglobin came back at 6.7, surprisingly low. I reviewed old records but could not find any previous notes or labs. Work-up reveals a severe iron deficiency anemia with a low ferritin and low iron level. Given normal vitals and chronic nature of this (most likely) I do not believe transfusion is needed at this time. I will get confirmation of the CT findings and discussed with ear nose and throat on call physician. 11:04 AM  After Brandt Gitelman with ENT as she be kept n.p.o. for surgical procedure this afternoon or evening. IV antibiotics are recommended which has already been provided. He has the hospitalist admit and patient will be likely discharged tomorrow or the following day. Amount and/or Complexity of Data Reviewed  External Data Reviewed: labs and notes. Details: no external labs could be found, no external notes found  Labs: ordered. Decision-making details documented in ED Course. Radiology: ordered and independent interpretation performed. Decision-making details documented in ED Course.      Details: left mastoiditis present-radiology agress but adds subperirosteal abscess  Discussion of management or test interpretation with external provider(s): D/w ENT Dr Chhaya Mcdaniels, keep npo and will drain abscess in OR asks hospitalist to admit. Risk  Prescription drug management. Decision regarding hospitalization. Orders Placed This Encounter   Procedures    Culture, Blood 1    Culture, Blood 1    CT MAXILLOFACIAL W CONTRAST    CBC    Comprehensive Metabolic Panel    Ferritin    Folate    Vitamin B12    Transferrin Saturation    Diet NPO    TYPE AND SCREEN    Insert peripheral IV        Medications   ketorolac (TORADOL) injection 15 mg (15 mg IntraVENous Given 2/21/23 6434)   piperacillin-tazobactam (ZOSYN) 4,500 mg in sodium chloride 0.9 % 100 mL IVPB (mini-bag) (0 mg IntraVENous Stopped 2/21/23 0840)   vancomycin (VANCOCIN) 1500 mg in sodium chloride 0.9% 500 mL IVPB (0 mg/kg × 59.9 kg IntraVENous Stopped 2/21/23 1100)   iopamidol (ISOVUE-370) 76 % injection 100 mL (100 mLs IntraVENous Given 2/21/23 0911)   sodium chloride flush 0.9 % injection 10 mL (10 mLs IntraVENous Given 2/21/23 0911)   0.9 % sodium chloride bolus (0 mLs IntraVENous Stopped 2/21/23 1100)       New Prescriptions    No medications on file        Crystal Dueñas is a 43 y.o. female who presents to the Emergency Department with chief complaint of    Chief Complaint   Patient presents with    Otalgia      60-year-old female presents with 1 week of left ear pain now with some swelling in that area of her scalp especially behind the ear for the last few days. She denies any recent or current runny nose congestion cough or sore throat. She denies any drainage from her ear. No fevers or chills. She denies any past medical history. Patient denies diabetes. Pain is sharp and without radiation and is mostly behind her left ear but some above and anterior to the ear as well. The history is provided by the patient.        Review of Systems   Constitutional:  Negative for chills and fever. HENT:  Negative for congestion, ear discharge, hearing loss, rhinorrhea and sore throat. Eyes:  Negative for visual disturbance. Respiratory:  Negative for cough and shortness of breath. Gastrointestinal:  Negative for abdominal pain, nausea and vomiting. Skin:  Positive for color change. Neurological:  Positive for headaches. Negative for weakness and numbness. No past medical history on file. No past surgical history on file. No family history on file. Social History     Socioeconomic History    Marital status:          Patient has no known allergies. Previous Medications    No medications on file        Vitals signs and nursing note reviewed. Patient Vitals for the past 4 hrs:   BP SpO2   02/21/23 1001 92/71 98 %          Physical Exam  Vitals and nursing note reviewed. Constitutional:       Appearance: Normal appearance. HENT:      Head: Normocephalic and atraumatic. Right Ear: Tympanic membrane and ear canal normal.      Left Ear: Tympanic membrane and ear canal normal.      Ears:      Comments: Left external ear appears slightly swollen but is not on tender. There is redness and warmth primarily posterior to the air area in the mastoid process is tender. There is some tenderness superior to the ear in the soft tissues as well. Some TMJ tenderness noted. Nose: Nose normal.      Mouth/Throat:      Mouth: Mucous membranes are moist.   Eyes:      Conjunctiva/sclera: Conjunctivae normal.      Pupils: Pupils are equal, round, and reactive to light. Cardiovascular:      Rate and Rhythm: Normal rate and regular rhythm. Pulses: Normal pulses. Heart sounds: Normal heart sounds. Pulmonary:      Effort: Pulmonary effort is normal.      Breath sounds: Normal breath sounds. Abdominal:      General: There is no distension. Palpations: Abdomen is soft. Tenderness: There is no abdominal tenderness.  There is no guarding or rebound. Musculoskeletal:         General: Normal range of motion. Cervical back: Neck supple. Right lower leg: No edema. Left lower leg: No edema. Lymphadenopathy:      Cervical: No cervical adenopathy. Skin:     General: Skin is warm and dry. Neurological:      Mental Status: She is alert and oriented to person, place, and time. Psychiatric:         Behavior: Behavior normal.        Procedures    Results for orders placed or performed during the hospital encounter of 02/21/23   CT MAXILLOFACIAL W CONTRAST    Narrative    EXAMINATION: CT MAXILLOFACIAL W CONTRAST 2/21/2023 9:19 AM    ACCESSION NUMBER: [id number]    COMPARISON: None available    INDICATION: left ear/facial pain-abscess? Mastoiditis? TECHNIQUE: Dedicated contrast-enhanced CT of the face was performed with axial  images. Reformats are provided. 100mL of Isovue-370 was administered  intravenously without complication. Radiation dose reduction techniques were used for this study. Our CT scanners  use one or all of the following: Automated exposure control, adjustment of the  mA and/or kV according to patient size, iterative reconstruction. FINDINGS:  BONES: No acute fracture. There is fluid within the left mastoid air cells. 1.9  x 0.5 cm subperiosteal collection along the left mastoid bone. SINUSES: Paranasal sinuses are clear. ORBITS: No evidence of globe hemorrhage or lens dislocation. No radiopaque  foreign body. VISUALIZED BRAIN: No visualized hemorrhage, mass effect, or herniation. SOFT TISSUES: Soft tissue stranding along the left mastoid region and posterior  to the ear canal.        Impression    1. CT findings suggestive of left mastoiditis with subperiosteal abscess along  the outer table of the left mastoid bone. Soft tissue swelling along the left  mastoid region. 2.  No intracranial findings are evident.      CBC   Result Value Ref Range    WBC 10.7 4.3 - 11.1 K/uL    RBC 3.57 (L) 4.05 - 5.2 M/uL    Hemoglobin 6.7 (LL) 11.7 - 15.4 g/dL    Hematocrit 24.6 (L) 35.8 - 46.3 %    MCV 68.9 (L) 82.0 - 102.0 FL    MCH 18.8 (L) 26.1 - 32.9 PG    MCHC 27.2 (L) 31.4 - 35.0 g/dL    RDW 18.6 (H) 11.9 - 14.6 %    Platelets 474 (H) 150 - 450 K/uL    MPV 8.6 (L) 9.4 - 12.3 FL    nRBC 0.00 0.0 - 0.2 K/uL   Comprehensive Metabolic Panel   Result Value Ref Range    Sodium 136 133 - 143 mmol/L    Potassium 3.5 3.5 - 5.1 mmol/L    Chloride 104 101 - 110 mmol/L    CO2 25 21 - 32 mmol/L    Anion Gap 7 2 - 11 mmol/L    Glucose 113 (H) 65 - 100 mg/dL    BUN 5 (L) 6 - 23 MG/DL    Creatinine 0.67 0.6 - 1.0 MG/DL    Est, Glom Filt Rate >60 >60 ml/min/1.73m2    Calcium 8.5 8.3 - 10.4 MG/DL    Total Bilirubin 0.3 0.2 - 1.1 MG/DL    ALT 17 12 - 65 U/L    AST 18 15 - 37 U/L    Alk Phosphatase 305 (H) 50 - 130 U/L    Total Protein 8.7 (H) 6.3 - 8.2 g/dL    Albumin 2.9 (L) 3.5 - 5.0 g/dL    Globulin 5.8 (H) 2.8 - 4.5 g/dL    Albumin/Globulin Ratio 0.5 0.4 - 1.6     Ferritin   Result Value Ref Range    Ferritin 3 (L) 8 - 388 NG/ML   Folate   Result Value Ref Range    Folate 3.4 3.1 - 17.5 ng/mL   Vitamin B12   Result Value Ref Range    Vitamin B-12 306 193 - 986 pg/mL   Transferrin Saturation   Result Value Ref Range    Iron 16 (L) 35 - 150 ug/dL    TIBC 418 250 - 450 ug/dL    TRANSFERRIN SATURATION 4 %   TYPE AND SCREEN   Result Value Ref Range    Crossmatch expiration date 02/24/2023,2359     ABO/Rh O POSITIVE     Antibody Screen NEG         CT MAXILLOFACIAL W CONTRAST   Final Result   1.  CT findings suggestive of left mastoiditis with subperiosteal abscess along   the outer table of the left mastoid bone. Soft tissue swelling along the left   mastoid region.   2.  No intracranial findings are evident.                             Voice dictation software was used during the making of this note.  This software is not perfect and grammatical and other typographical errors may be present.  This note has not been completely proofread  for errors.      Maye Sevilla MD  02/21/23 3217

## 2023-02-22 PROBLEM — D50.8 IRON DEFICIENCY ANEMIA SECONDARY TO INADEQUATE DIETARY IRON INTAKE: Chronic | Status: ACTIVE | Noted: 2023-02-22

## 2023-02-22 PROBLEM — D50.8 IRON DEFICIENCY ANEMIA SECONDARY TO INADEQUATE DIETARY IRON INTAKE: Status: ACTIVE | Noted: 2023-02-22

## 2023-02-22 LAB
ABO + RH BLD: NORMAL
ALBUMIN SERPL-MCNC: 2.2 G/DL (ref 3.5–5)
ALBUMIN/GLOB SERPL: 0.4 (ref 0.4–1.6)
ALP SERPL-CCNC: 260 U/L (ref 50–136)
ALT SERPL-CCNC: 15 U/L (ref 12–65)
ANION GAP SERPL CALC-SCNC: 4 MMOL/L (ref 2–11)
AST SERPL-CCNC: 17 U/L (ref 15–37)
BASOPHILS # BLD: 0 K/UL (ref 0–0.2)
BASOPHILS NFR BLD: 0 % (ref 0–2)
BILIRUB SERPL-MCNC: 0.3 MG/DL (ref 0.2–1.1)
BLD PROD TYP BPU: NORMAL
BLOOD BANK CMNT PATIENT-IMP: NORMAL
BLOOD BANK DISPENSE STATUS: NORMAL
BLOOD GROUP ANTIBODIES SERPL: NORMAL
BPU ID: NORMAL
BUN SERPL-MCNC: 7 MG/DL (ref 6–23)
CALCIUM SERPL-MCNC: 8.1 MG/DL (ref 8.3–10.4)
CHLORIDE SERPL-SCNC: 106 MMOL/L (ref 101–110)
CO2 SERPL-SCNC: 26 MMOL/L (ref 21–32)
CREAT SERPL-MCNC: 0.74 MG/DL (ref 0.6–1)
CROSSMATCH RESULT: NORMAL
DIFFERENTIAL METHOD BLD: ABNORMAL
EOSINOPHIL # BLD: 0.3 K/UL (ref 0–0.8)
EOSINOPHIL NFR BLD: 3 % (ref 0.5–7.8)
ERYTHROCYTE [DISTWIDTH] IN BLOOD BY AUTOMATED COUNT: 19.9 % (ref 11.9–14.6)
GLOBULIN SER CALC-MCNC: 5.4 G/DL (ref 2.8–4.5)
GLUCOSE SERPL-MCNC: 109 MG/DL (ref 65–100)
HCT VFR BLD AUTO: 28.7 % (ref 35.8–46.3)
HGB BLD-MCNC: 8.2 G/DL (ref 11.7–15.4)
IMM GRANULOCYTES # BLD AUTO: 0 K/UL (ref 0–0.5)
IMM GRANULOCYTES NFR BLD AUTO: 0 % (ref 0–5)
LYMPHOCYTES # BLD: 2.6 K/UL (ref 0.5–4.6)
LYMPHOCYTES NFR BLD: 26 % (ref 13–44)
MCH RBC QN AUTO: 20.9 PG (ref 26.1–32.9)
MCHC RBC AUTO-ENTMCNC: 28.6 G/DL (ref 31.4–35)
MCV RBC AUTO: 73 FL (ref 82–102)
MONOCYTES # BLD: 0.5 K/UL (ref 0.1–1.3)
MONOCYTES NFR BLD: 5 % (ref 4–12)
NEUTS SEG # BLD: 6.5 K/UL (ref 1.7–8.2)
NEUTS SEG NFR BLD: 65 % (ref 43–78)
NRBC # BLD: 0 K/UL (ref 0–0.2)
PLATELET # BLD AUTO: 357 K/UL (ref 150–450)
PMV BLD AUTO: 8.8 FL (ref 9.4–12.3)
POTASSIUM SERPL-SCNC: 3.7 MMOL/L (ref 3.5–5.1)
PROT SERPL-MCNC: 7.6 G/DL (ref 6.3–8.2)
RBC # BLD AUTO: 3.93 M/UL (ref 4.05–5.2)
SODIUM SERPL-SCNC: 136 MMOL/L (ref 133–143)
SPECIMEN EXP DATE BLD: NORMAL
UNIT DIVISION: 0
VANCOMYCIN SERPL-MCNC: 10.1 UG/ML
WBC # BLD AUTO: 10 K/UL (ref 4.3–11.1)

## 2023-02-22 PROCEDURE — 1100000000 HC RM PRIVATE

## 2023-02-22 PROCEDURE — 80053 COMPREHEN METABOLIC PANEL: CPT

## 2023-02-22 PROCEDURE — 2580000003 HC RX 258: Performed by: STUDENT IN AN ORGANIZED HEALTH CARE EDUCATION/TRAINING PROGRAM

## 2023-02-22 PROCEDURE — 80202 ASSAY OF VANCOMYCIN: CPT

## 2023-02-22 PROCEDURE — 6360000002 HC RX W HCPCS: Performed by: STUDENT IN AN ORGANIZED HEALTH CARE EDUCATION/TRAINING PROGRAM

## 2023-02-22 PROCEDURE — 36415 COLL VENOUS BLD VENIPUNCTURE: CPT

## 2023-02-22 PROCEDURE — 85025 COMPLETE CBC W/AUTO DIFF WBC: CPT

## 2023-02-22 RX ADMIN — SODIUM CHLORIDE, PRESERVATIVE FREE 10 ML: 5 INJECTION INTRAVENOUS at 21:25

## 2023-02-22 RX ADMIN — CEFEPIME 2000 MG: 2 INJECTION, POWDER, FOR SOLUTION INTRAVENOUS at 00:55

## 2023-02-22 RX ADMIN — VANCOMYCIN HYDROCHLORIDE 1000 MG: 1 INJECTION, POWDER, LYOPHILIZED, FOR SOLUTION INTRAVENOUS at 21:23

## 2023-02-22 RX ADMIN — CEFEPIME 2000 MG: 2 INJECTION, POWDER, FOR SOLUTION INTRAVENOUS at 12:50

## 2023-02-22 RX ADMIN — CEFEPIME 2000 MG: 2 INJECTION, POWDER, FOR SOLUTION INTRAVENOUS at 23:51

## 2023-02-22 RX ADMIN — VANCOMYCIN HYDROCHLORIDE 1000 MG: 1 INJECTION, POWDER, LYOPHILIZED, FOR SOLUTION INTRAVENOUS at 09:39

## 2023-02-22 ASSESSMENT — PAIN DESCRIPTION - LOCATION: LOCATION: EAR;INCISION

## 2023-02-22 ASSESSMENT — PAIN DESCRIPTION - ORIENTATION: ORIENTATION: LEFT

## 2023-02-22 ASSESSMENT — PAIN SCALES - GENERAL: PAINLEVEL_OUTOF10: 0

## 2023-02-22 NOTE — PROGRESS NOTES
VANCO DAILY FOLLOW UP NOTE  [address] [facility] Pharmacokinetic Monitoring Service - Vancomycin    Consulting Provider: Dr Boom Rose   Indication: SSTI  Target Concentration: Goal AUC/LUIS 400-600 mg*hr/L  Day of Therapy: 2  Additional Antimicrobials: Cefepime    Patient eligible for piperacillin-tazobactam to cefepime auto-substitution per P&T approved protocol? YES    Pertinent Laboratory Values: Wt Readings from Last 1 Encounters:   02/22/23 136 lb 12.8 oz (62.1 kg)     Temp Readings from Last 1 Encounters:   02/22/23 97.9 °F (36.6 °C) (Oral)     Recent Labs     02/21/23  0733 02/22/23  0642   BUN 5* 7   CREATININE 0.67 0.74   WBC 10.7 10.0     Estimated Creatinine Clearance: 81 mL/min (based on SCr of 0.74 mg/dL). Lab Results   Component Value Date/Time    VANCORANDOM 10.1 02/22/2023 06:42 AM       MRSA Nasal Swab: N/A.  Non-respiratory infection    Assessment:  Date/Time Dose Concentration AUC   2/22/23  0642 1 gm q12h 10.1 481   Note: Serum concentrations collected for AUC dosing may appear elevated if collected in close proximity to the dose administered, this is not necessarily an indication of toxicity    Plan:  Current dosing regimen is therapeutic  Continue current dose  Repeat vancomycin concentrations will be ordered as clinically appropriate   Pharmacy will continue to monitor patient and adjust therapy as indicated      Thank you for the consult,    Fito Jacobsen, PharmD

## 2023-02-22 NOTE — PERIOP NOTE
TRANSFER - OUT REPORT:    Verbal report given to Ronda Vasquez RN on Andreas Mora  being transferred to Room 357 for routine progression of patient care       Report consisted of patient's Situation, Background, Assessment and   Recommendations(SBAR). Information from the following report(s) Nurse Handoff Report, Surgery Report, Intake/Output, MAR, Recent Results, and Cardiac Rhythm SB  was reviewed with the receiving nurse. [place] Assessment: Presents to emergency department  because of falls (Syncope, seizure, or loss of consciousness): No, Age > 79: No, Altered Mental Status, Intoxication with alcohol or substance confusion (Disorientation, impaired judgment, poor safety awaremess, or inability to follow instructions): No, Impaired Mobility: Ambulates or transfers with assistive devices or assistance; Unable to ambulate or transer.: No  Lines:   Peripheral IV 02/21/23 Right Wrist (Active)   Site Assessment Clean, dry & intact 02/21/23 1919   Line Status Infusing;Flushed 02/21/23 [address] Connections checked and tightened 02/21/23 1919   Phlebitis Assessment No symptoms 02/21/23 1919   Infiltration Assessment 0 02/21/23 1919   Alcohol Cap Used No 02/21/23 1919   Dressing Status Clean, dry & intact 02/21/23 1919   Dressing Type Transparent 02/21/23 1919       Peripheral IV 02/21/23 Left Hand (Active)   Site Assessment Clean, dry & intact 02/21/23 1919   Line Status Capped 02/21/23 1919   Phlebitis Assessment No symptoms 02/21/23 1919   Infiltration Assessment 0 02/21/23 1919   Alcohol Cap Used No 02/21/23 1919   Dressing Status Clean, dry & intact 02/21/23 1919   Dressing Type Transparent 02/21/23 1919        Opportunity for questions and clarification was provided.       Patient transported with:  O2 @ room air lpm

## 2023-02-22 NOTE — CARE COORDINATION
Discharge planning was discussed with the Interdisciplinary Team. The patient does not have health insurance. Case Management will continue to follow this patient for discharge planning needs.

## 2023-02-22 NOTE — PROGRESS NOTES
Hospitalist Progress Note   Admit Date:  2023  6:50 AM   Name:  Jett Blanchard   Age:  43 y.o. Sex:  female  :  1980   MRN:  [id number]   Room:  [facility]/    Presenting Complaint: Otalgia     Reason(s) for Admission: Subperiosteal abscess of left mastoid [H70.012]  Acute mastoiditis of left side [H70.002]     Hospital Course:   42F PMHx anemia who presented with left ear pain and swelling of her scalp. Patient states her symptoms started about 1 week ago. Denied any upper respiratory symptoms. Denied any drainage from that ear. Denied any cardiac chest pain, shortness of breath, abdominal pain, fever/chills. Patient stated that pain is sharp and without radiation but behind the left ear. Patient speaks English as her second language. CT scan with evidence of left mastoiditis. ENT made aware in the ED. Patient was made NPO. Patient also found to have hemoglobin at 6.7. Patient also with iron deficiency anemia with low iron and ferritin levels. Patient received 1 unit packed red blood cell. ENT I&D . Subjective & 24hr Events (23):   Pain controlled by meds. Plan discussed with nurse and . Discussed with patient and family. Anticipate DC tomorrow. Assessment & Plan:   Acute mastoiditis of left side  I&D   -consult ENT  -cefepime, vancomycin  -follow cultures    Iron deficiency anemia secondary to inadequate dietary iron intake  Transfused pRBC 1U   -transfuse Hgb < 7  2023: Hgb 8.2    PT/OT evals and PPD needed/ordered? NA    Diet:  ADULT DIET; Regular  VTE prophylaxis:  Ambulatory   Code status: Full Code    Hospital Problems:  Principal Problem:    Acute mastoiditis of left side  Active Problems:    Iron deficiency anemia secondary to inadequate dietary iron intake  Resolved Problems:    * No resolved hospital problems.  *      Objective:   Patient Vitals for the past 24 hrs:   Temp Pulse Resp BP SpO2   23 0718 97.7 °F (36.5 °C) 84 18 94/62 100 % 02/22/23 0303 98.6 °F (37 °C) 64 19 104/68 100 %   02/21/23 2255 98.6 °F (37 °C) 68 19 108/80 100 %   02/21/23 2100 97.9 °F (36.6 °C) 56 19 111/61 100 %   02/21/23 2015 -- 54 20 113/66 99 %   02/21/23 2000 -- 52 20 (!) 111/59 99 %   02/21/23 1945 -- 64 20 (!) 118/59 99 %   02/21/23 1930 -- 57 20 (!) 106/58 98 %   02/21/23 1915 -- 58 20 (!) 103/57 97 %   02/21/23 1910 -- 57 20 (!) 103/58 100 %   02/21/23 1905 -- 54 20 (!) 102/55 100 %   02/21/23 1900 -- 55 20 (!) 103/58 100 %   02/21/23 1855 -- 58 20 113/62 100 %   02/21/23 1853 -- 74 -- 108/60 100 %   02/21/23 1850 -- 53 20 108/60 100 %   02/21/23 1845 -- 58 20 105/60 100 %   02/21/23 1840 -- 55 20 (!) 106/59 100 %   02/21/23 1835 -- 56 20 (!) 102/55 100 %   02/21/23 1830 -- 79 20 (!) 106/59 100 %   02/21/23 1829 98.2 °F (36.8 °C) 70 20 (!) 106/59 97 %   02/21/23 1500 -- -- -- 100/76 100 %   02/21/23 1459 98.6 °F (37 °C) 78 16 98/77 97 %   02/21/23 1457 -- -- -- 98/77 100 %   02/21/23 1400 -- -- -- 96/67 99 %   02/21/23 1247 97.9 °F (36.6 °C) 75 16 104/73 100 %   02/21/23 1227 98.2 °F (36.8 °C) 72 16 103/72 100 %   02/21/23 1200 -- -- -- 108/76 100 %   02/21/23 1100 -- -- -- 99/68 100 %   02/21/23 1001 -- -- -- 92/71 98 %       Oxygen Therapy  SpO2: 100 %  Pulse via Oximetry: 56 beats per minute  Pulse Oximeter Device Mode: Continuous  O2 Device: None (Room air)  O2 Flow Rate (L/min): 1 L/min    Estimated body mass index is 26.72 kg/m² as calculated from the following:    Height as of this encounter: 5' (1.524 m). Weight as of this encounter: 136 lb 12.8 oz (62.1 kg). Intake/Output Summary (Last 24 hours) at 2/22/2023 0906  Last data filed at 2/21/2023 1722  Gross per 24 hour   Intake --   Output 0 ml   Net 0 ml       Blood pressure 94/62, pulse 84, temperature 97.7 °F (36.5 °C), temperature source Oral, resp. rate 18, height 5' (1.524 m), weight 136 lb 12.8 oz (62.1 kg), SpO2 100 %. Physical Exam  Vitals and nursing note reviewed.    Constitutional: General: She is not in acute distress. Appearance: She is not diaphoretic. HENT:      Head:      Comments: Surgical bandage  Eyes:      Extraocular Movements: Extraocular movements intact. Cardiovascular:      Rate and Rhythm: Normal rate. Pulmonary:      Effort: Pulmonary effort is normal. No respiratory distress. Abdominal:      General: There is no distension. Musculoskeletal:         General: No deformity. Skin:     Coloration: Skin is not jaundiced or pale. Neurological:      General: No focal deficit present. Mental Status: She is alert and oriented to person, place, and time.    Psychiatric:         Mood and Affect: Mood normal.         Behavior: Behavior normal.      Comments: pleasant         I have personally reviewed labs and tests:  Recent Labs:  Recent Results (from the past 48 hour(s))   CBC    Collection Time: 02/21/23  7:33 AM   Result Value Ref Range    WBC 10.7 4.3 - 11.1 K/uL    RBC 3.57 (L) 4.05 - 5.2 M/uL    Hemoglobin 6.7 (LL) 11.7 - 15.4 g/dL    Hematocrit 24.6 (L) 35.8 - 46.3 %    MCV 68.9 (L) 82.0 - 102.0 FL    MCH 18.8 (L) 26.1 - 32.9 PG    MCHC 27.2 (L) 31.4 - 35.0 g/dL    RDW 18.6 (H) 11.9 - 14.6 %    Platelets 268 (H) 841 - 450 K/uL    MPV 8.6 (L) 9.4 - 12.3 FL    nRBC 0.00 0.0 - 0.2 K/uL   Comprehensive Metabolic Panel    Collection Time: 02/21/23  7:33 AM   Result Value Ref Range    Sodium 136 133 - 143 mmol/L    Potassium 3.5 3.5 - 5.1 mmol/L    Chloride 104 101 - 110 mmol/L    CO2 25 21 - 32 mmol/L    Anion Gap 7 2 - 11 mmol/L    Glucose 113 (H) 65 - 100 mg/dL    BUN 5 (L) 6 - 23 MG/DL    Creatinine 0.67 0.6 - 1.0 MG/DL    Est, Glom Filt Rate >60 >60 ml/min/1.73m2    Calcium 8.5 8.3 - 10.4 MG/DL    Total Bilirubin 0.3 0.2 - 1.1 MG/DL    ALT 17 12 - 65 U/L    AST 18 15 - 37 U/L    Alk Phosphatase 305 (H) 50 - 130 U/L    Total Protein 8.7 (H) 6.3 - 8.2 g/dL    Albumin 2.9 (L) 3.5 - 5.0 g/dL    Globulin 5.8 (H) 2.8 - 4.5 g/dL    Albumin/Globulin Ratio 0.5 0.4 - 1.6 Culture, Blood 1    Collection Time: 02/21/23  7:33 AM    Specimen: Blood   Result Value Ref Range    Special Requests RIGHT  HAND        Culture NO GROWTH AFTER 20 HOURS     Ferritin    Collection Time: 02/21/23  7:33 AM   Result Value Ref Range    Ferritin 3 (L) 8 - 388 NG/ML   Folate    Collection Time: 02/21/23  7:33 AM   Result Value Ref Range    Folate 3.4 3.1 - 17.5 ng/mL   Vitamin B12    Collection Time: 02/21/23  7:33 AM   Result Value Ref Range    Vitamin B-12 306 193 - 986 pg/mL   Transferrin Saturation    Collection Time: 02/21/23  7:33 AM   Result Value Ref Range    Iron 16 (L) 35 - 150 ug/dL    TIBC 418 250 - 450 ug/dL    TRANSFERRIN SATURATION 4 %   Pregnancy, HCG qualitative serum    Collection Time: 02/21/23  7:33 AM   Result Value Ref Range    HCG, Ql. Negative NEG     Culture, Blood 1    Collection Time: 02/21/23  7:43 AM    Specimen: Blood   Result Value Ref Range    Special Requests LEFT  HAND        Culture NO GROWTH AFTER 20 HOURS     TYPE AND SCREEN    Collection Time: 02/21/23  8:21 AM   Result Value Ref Range    Crossmatch expiration date 02/24/2023,2357     ABO/Rh O POSITIVE     Antibody Screen NEG     Blood Bank Comment       VINCE NOTIFIED IN ER THAT UNIT IS READY 2/21/23 @ 11:54 BY ALINA    Unit Number [id number]     Product Code Blood Bank RC LR     Unit Divison 00     Dispense Status Blood Bank TRANSFUSED     Crossmatch Result Compatible    PREPARE RBC (CROSSMATCH), 1 Units    Collection Time: 02/21/23 11:45 AM   Result Value Ref Range    History Check Historical check performed    Hemoglobin and Hematocrit    Collection Time: 02/21/23  4:17 PM   Result Value Ref Range    Hemoglobin 8.8 (L) 11.7 - 15.4 g/dL    Hematocrit 30.9 (L) 35.8 - 46.3 %   Culture, Wound    Collection Time: 02/21/23  5:39 PM    Specimen: Abscess    LEFT EAR   Result Value Ref Range    Special Requests NO SPECIAL REQUESTS      Gram stain PENDING     Culture        No growth after short period of incubation. Further results to follow after overnight incubation. Vancomycin Level, Random    Collection Time: 02/22/23  6:42 AM   Result Value Ref Range    Vancomycin Rm 10.1 UG/ML   Comprehensive Metabolic Panel w/ Reflex to MG    Collection Time: 02/22/23  6:42 AM   Result Value Ref Range    Sodium 136 133 - 143 mmol/L    Potassium 3.7 3.5 - 5.1 mmol/L    Chloride 106 101 - 110 mmol/L    CO2 26 21 - 32 mmol/L    Anion Gap 4 2 - 11 mmol/L    Glucose 109 (H) 65 - 100 mg/dL    BUN 7 6 - 23 MG/DL    Creatinine 0.74 0.6 - 1.0 MG/DL    Est, Glom Filt Rate >60 >60 ml/min/1.73m2    Calcium 8.1 (L) 8.3 - 10.4 MG/DL    Total Bilirubin 0.3 0.2 - 1.1 MG/DL    ALT 15 12 - 65 U/L    AST 17 15 - 37 U/L    Alk Phosphatase 260 (H) 50 - 136 U/L    Total Protein 7.6 6.3 - 8.2 g/dL    Albumin 2.2 (L) 3.5 - 5.0 g/dL    Globulin 5.4 (H) 2.8 - 4.5 g/dL    Albumin/Globulin Ratio 0.4 0.4 - 1.6     CBC with Auto Differential    Collection Time: 02/22/23  6:42 AM   Result Value Ref Range    WBC 10.0 4.3 - 11.1 K/uL    RBC 3.93 (L) 4.05 - 5.2 M/uL    Hemoglobin 8.2 (L) 11.7 - 15.4 g/dL    Hematocrit 28.7 (L) 35.8 - 46.3 %    MCV 73.0 (L) 82.0 - 102.0 FL    MCH 20.9 (L) 26.1 - 32.9 PG    MCHC 28.6 (L) 31.4 - 35.0 g/dL    RDW 19.9 (H) 11.9 - 14.6 %    Platelets 896 046 - 256 K/uL    MPV 8.8 (L) 9.4 - 12.3 FL    nRBC 0.00 0.0 - 0.2 K/uL    Differential Type AUTOMATED      Seg Neutrophils 65 43 - 78 %    Lymphocytes 26 13 - 44 %    Monocytes 5 4.0 - 12.0 %    Eosinophils % 3 0.5 - 7.8 %    Basophils 0 0.0 - 2.0 %    Immature Granulocytes 0 0.0 - 5.0 %    Segs Absolute 6.5 1.7 - 8.2 K/UL    Absolute Lymph # 2.6 0.5 - 4.6 K/UL    Absolute Mono # 0.5 0.1 - 1.3 K/UL    Absolute Eos # 0.3 0.0 - 0.8 K/UL    Basophils Absolute 0.0 0.0 - 0.2 K/UL    Absolute Immature Granulocyte 0.0 0.0 - 0.5 K/UL       I have personally reviewed imaging studies:  Other Studies:  CT MAXILLOFACIAL W CONTRAST   Final Result   1.   CT findings suggestive of left mastoiditis with subperiosteal abscess along   the outer table of the left mastoid bone. Soft tissue swelling along the left   mastoid region. 2.  No intracranial findings are evident.              Current Meds:  Current Facility-Administered Medications   Medication Dose Route Frequency    sodium chloride flush 0.9 % injection 5-40 mL  5-40 mL IntraVENous 2 times per day    sodium chloride flush 0.9 % injection 5-40 mL  5-40 mL IntraVENous PRN    0.9 % sodium chloride infusion   IntraVENous PRN    ondansetron (ZOFRAN-ODT) disintegrating tablet 4 mg  4 mg Oral Q8H PRN    Or    ondansetron (ZOFRAN) injection 4 mg  4 mg IntraVENous Q6H PRN    polyethylene glycol (GLYCOLAX) packet 17 g  17 g Oral Daily PRN    acetaminophen (TYLENOL) tablet 650 mg  650 mg Oral Q6H PRN    Or    acetaminophen (TYLENOL) suppository 650 mg  650 mg Rectal Q6H PRN    magnesium sulfate 2000 mg in 50 mL IVPB premix  2,000 mg IntraVENous PRN    potassium chloride (KLOR-CON M) extended release tablet 40 mEq  40 mEq Oral PRN    Or    potassium bicarb-citric acid (EFFER-K) effervescent tablet 40 mEq  40 mEq Oral PRN    Or    potassium chloride 10 mEq/100 mL IVPB (Peripheral Line)  10 mEq IntraVENous PRN    0.9 % sodium chloride infusion   IntraVENous PRN    diphenhydrAMINE (BENADRYL) injection 12.5 mg  12.5 mg IntraVENous Q6H PRN    cefepime (MAXIPIME) 2,000 mg in sodium chloride 0.9 % 50 mL IVPB (mini-bag)  2,000 mg IntraVENous Q12H    morphine injection 2 mg  2 mg IntraVENous Q4H PRN    lactated ringers IV soln infusion   IntraVENous Continuous    vancomycin (VANCOCIN) 1,000 mg in sodium chloride 0.9 % 250 mL IVPB ([id number])  1,000 mg IntraVENous Q12H       Signed:  Elisabeth Echeverria MD

## 2023-02-22 NOTE — ANESTHESIA POSTPROCEDURE EVALUATION
Department of Anesthesiology  Postprocedure Note    Patient: Alba Friedman  MRN: [id number]  YOB: 1980  Date of evaluation: 2/21/2023      Procedure Summary     Date: 02/21/23 Room / Location: [facility] [place] MAIN OR 02 / [facility] [place] MAIN OR    Anesthesia Start: 1705 Anesthesia Stop: 2528    Procedure: INCISION AND DRAINAGE LEFT MASTOID ABCESS/ POSS MYRINGOTOMY TUBES (Left: Ear) Diagnosis:       Abscess of left mastoid      (Abcess of the left mastoid)      (Ear infection, poss tubes)    Surgeons: Myesha La MD Responsible Provider: Alexandra Hanson MD    Anesthesia Type: general ASA Status: 3 - Emergent          Anesthesia Type: No value filed.     Laurel Phase I: Laurel Score: 9    Laurel Phase II:        Anesthesia Post Evaluation    Patient location during evaluation: PACU  Patient participation: complete - patient participated  Level of consciousness: awake and alert  Airway patency: patent  Nausea & Vomiting: no nausea and no vomiting  Complications: no  Cardiovascular status: hemodynamically stable  Respiratory status: acceptable, nonlabored ventilation and spontaneous ventilation  Hydration status: euvolemic  Comments: BP (!) 118/59   Pulse 64   Temp 98.2 °F (36.8 °C) (Temporal)   Resp 20   Ht 5' (1.524 m)   Wt 132 lb (59.9 kg)   SpO2 99%   BMI 25.78 kg/m²     Multimodal analgesia pain management approach

## 2023-02-22 NOTE — OP NOTE
[place]  OPERATIVE REPORT    Name:  Marito Baca  MR#:  [id number]  :  1980  ACCOUNT #:  [de-identified]  DATE OF SERVICE:  2023    PREOPERATIVE DIAGNOSES:  1. Left postauricular abscess. 2.  Left otitis media. POSTOPERATIVE DIAGNOSES:  1. Left postauricular abscess. 2.  Left otitis media. PROCEDURES PERFORMED:  1. Incision and drainage of left postauricular abscess. 2.  Left myringotomy. SURGEON:  Marcell Turner. Paty Kearney MD    ASSISTANT:  none. ANESTHESIA:  General endotracheal.    ANESTHESIOLOGIST:  Alo Perez MD    COMPLICATIONS:  None. SPECIMENS REMOVED:    L ear culture  2. L ear biopsy. IMPLANTS:  none. ESTIMATED BLOOD LOSS:  Minimal.    OPERATIVE FINDINGS:  1. There was a left postauricular subperiosteal abscess overlying the mastoid bone. There was cloudy fluid with some inflamed tissue within this abscess cavity. A culture and biopsy was performed. 2.  Penrose drain was placed and the incision was lightly closed. 3.  There was significant debris within the left EAC and a very thickened and inflamed tympanic membrane. A myringotomy was performed and I irrigated out the middle ear space, but was unable to place tube due to the amount of inflammation. The left external auditory canal was packed with Ciprodex-soaked Gelfoam.    IV FLUIDS:  500 mL of crystalloid. DRAINS:  Penrose x1. DISPOSITION:  PACU, then home. CONDITION:  Stable. BRIEF HISTORY:  The patient is a 63-year-old female who presented through the emergency department earlier today with left-sided otalgia for the past week. She was worked up with a CT scan which revealed a subperiosteal abscess along the left mastoid bone and fluid within the left middle ear space and mastoid. The decision was made to take her to the operating room for incision and drainage of this left postauricular abscess and possible left ear tube.     DESCRIPTION OF PROCEDURE:  The patient was brought back to the operating room and placed on the table in the supine position. General endotracheal anesthesia was inducted without any complications. Once the patient was adequately sedated, I injected 5 mL of 1% lidocaine with 1:100,000 epinephrine along the left postauricular region. She was then sterilely prepped and draped in the usual fashion. I began by designing a 3 cm incision along the left postauricular crease. There was significant edema and even induration of the skin. I incised the skin and dermis with a 15 blade and then dissected through some subcutaneous fat and postauricular muscle with Bovie electrocautery. I dissected down onto the mastoid bone. There was a fluctuant cavity within the subperiosteal space which was opened up using the 15 blade. There was some cloudy fluid which was cultured and very thickened tissue within the abscess cavity. This was sent off for biopsy labeled left ear lesion. I then irrigated out the abscess cavity with copious sterile saline. The incision was then closed in layers. A quarter-inch Penrose drain was placed through the inferior part of the incision and secured in place with a 2-0 silk suture. I lightly closed the superior part of the incision with 3-0 Vicryl deep sutures and 5-0 fast-absorbing gut in a running locking fashion. Next, I used the operating microscope to look down the left ear canal.  There was significant white debris which was suctioned and irrigated out. I visualized the tympanic membrane which was very thickened and dulled with an obvious effusion. I used a myringotomy blade and made a radial incision inferiorly. The drum was extremely thick and there was significant bleeding from the incision.   I suctioned out the middle ear space and then due to the amount of bleeding, I packed off the external auditory canal with Ciprodex-soaked Gelfoam.  A cotton ball was placed within the ear canal and then a pressure dressing across the mastoid was applied using fluffs and Amber wrap. This concluded the surgical portion of the procedure. The patient was then awakened from anesthesia, extubated and taken to the PACU in stable condition afterwards.       Jaren Baumann MD      [id number]  D:  02/21/2023 18:40  T:  02/21/2023 20:49  JOB #:  [id number]

## 2023-02-22 NOTE — PROGRESS NOTES
44 yo female POD 1 after I&D of L post-auricular abscess and L myringotomy. Doing better today other than some intermittent sharp L sided otalgia. She does have HA from the pressure dressing    /63   Pulse 75   Temp 97.9 °F (36.6 °C) (Oral)   Resp 16   Ht 5' (1.524 m)   Wt 136 lb 12.8 oz (62.1 kg)   SpO2 100%   BMI 26.72 kg/m²   -Pressure dressing removed- incision line intact, drain removed- minimal purulent drainage  -No facial weakness  -Gelfoam packing down L EAC    A/P: L mastoid sub-periosteal abscess- doing better after I&D yesterday. I removed pressure dressing and drain. I think she should be fine for D/C home tomorrow on oral abx- Augmentin should suffice.  I will get her F/U next wk in office    Kathi Marie

## 2023-02-23 VITALS
HEIGHT: 60 IN | TEMPERATURE: 97.7 F | OXYGEN SATURATION: 100 % | RESPIRATION RATE: 16 BRPM | HEART RATE: 70 BPM | WEIGHT: 135 LBS | BODY MASS INDEX: 26.5 KG/M2 | SYSTOLIC BLOOD PRESSURE: 97 MMHG | DIASTOLIC BLOOD PRESSURE: 62 MMHG

## 2023-02-23 PROCEDURE — 6360000002 HC RX W HCPCS: Performed by: FAMILY MEDICINE

## 2023-02-23 PROCEDURE — 2580000003 HC RX 258: Performed by: FAMILY MEDICINE

## 2023-02-23 PROCEDURE — 6370000000 HC RX 637 (ALT 250 FOR IP): Performed by: FAMILY MEDICINE

## 2023-02-23 PROCEDURE — 2580000003 HC RX 258: Performed by: STUDENT IN AN ORGANIZED HEALTH CARE EDUCATION/TRAINING PROGRAM

## 2023-02-23 RX ORDER — AMOXICILLIN AND CLAVULANATE POTASSIUM 875; 125 MG/1; MG/1
1 TABLET, FILM COATED ORAL EVERY 12 HOURS SCHEDULED
Status: DISCONTINUED | OUTPATIENT
Start: 2023-02-23 | End: 2023-02-23 | Stop reason: HOSPADM

## 2023-02-23 RX ORDER — TRAMADOL HYDROCHLORIDE 50 MG/1
50 TABLET ORAL EVERY 6 HOURS PRN
Qty: 20 TABLET | Refills: 0 | Status: SHIPPED | OUTPATIENT
Start: 2023-02-23 | End: 2023-02-28

## 2023-02-23 RX ORDER — AMOXICILLIN AND CLAVULANATE POTASSIUM 875; 125 MG/1; MG/1
1 TABLET, FILM COATED ORAL EVERY 12 HOURS SCHEDULED
Qty: 18 TABLET | Refills: 0 | Status: SHIPPED | OUTPATIENT
Start: 2023-02-23 | End: 2023-03-04

## 2023-02-23 RX ORDER — FERROUS SULFATE 325(65) MG
325 TABLET ORAL 2 TIMES DAILY
Qty: 60 TABLET | Refills: 0 | Status: SHIPPED | OUTPATIENT
Start: 2023-02-23

## 2023-02-23 RX ADMIN — SODIUM CHLORIDE, PRESERVATIVE FREE 10 ML: 5 INJECTION INTRAVENOUS at 08:41

## 2023-02-23 RX ADMIN — SODIUM CHLORIDE 125 MG: 9 INJECTION, SOLUTION INTRAVENOUS at 11:27

## 2023-02-23 RX ADMIN — AMOXICILLIN AND CLAVULANATE POTASSIUM 1 TABLET: 875; 125 TABLET, FILM COATED ORAL at 09:46

## 2023-02-23 NOTE — PROGRESS NOTES
Pt discharged to home with spouse. PIV removed. Dressing dry and intact behind left ear. Pt denies pain, roger diet and fluids.  They will be notified of appt for next week with ENT

## 2023-02-23 NOTE — CARE COORDINATION
02/23/23 0935   Service Assessment   Patient Orientation Alert and Oriented   Cognition Alert   History Provided By Significant Other   Primary Caregiver Self   Support Systems Spouse/Significant Other   Patient's Healthcare Decision Maker is: Legal Next of Kin   PCP Verified by CM Yes  (The patient/spouse confirmed they received information on [employer], and [employer].)   Prior Functional Level Independent in ADLs/IADLs   Current Functional Level Independent in ADLs/IADLs   Can patient return to prior living arrangement Yes   Ability to make needs known: Good   Family able to assist with home care needs: Yes   Financial Resources Other (Comment)  (The patient was given a [employer] card)   [employer] None   Social/Functional History   Lives With Spouse   Type of [address] None   Active  Yes   Occupation Unemployed   Discharge Planning   Type of [address] Prior To Admission None   Potential Assistance Needed N/A  (The patient's spouse confirmed he can afford the patient's prescriptions.)   DME Ordered? No   Potential Assistance Purchasing Medications No   Type of Home Care Services None   Services At/After Discharge   Transition of Care Consult (CM Consult) N/A   Services At/After Discharge None     RN CM met with the patient and her spouse at the bedside and discussed discharge planning. They were given community resources and had an application for Financial Assistance at the bedside. RN CM encouraged them to ask questions. They verbalized understanding and agreement with the discharge plan.

## 2023-02-24 ENCOUNTER — TELEPHONE (OUTPATIENT)
Dept: INTERNAL MEDICINE CLINIC | Facility: CLINIC | Age: 43
End: 2023-02-24

## 2023-02-24 LAB
BACTERIA SPEC CULT: NORMAL
SERVICE CMNT-IMP: NORMAL

## 2023-02-24 NOTE — TELEPHONE ENCOUNTER
Patient's spouse returned call stating he is going to call Dr Carie Pedro office to schedule 2 week follow up appt. States he will contact [facility] on Monday to schedule appt. .  States will call back if he has questions.

## 2023-02-24 NOTE — TELEPHONE ENCOUNTER
Called patient to schedule TCV appt following discharge from [facility] 02/23/2023. Dx Subperiosteal abscess of left mastoid     Per discharge patient to follow up with Dr Gerardo Doctor in 2 weeks. Information given for [employer] and [facility].     1st attempt to contact patient using phone number listed in chart LVM

## 2023-02-25 LAB
BACTERIA SPEC CULT: ABNORMAL
BACTERIA SPEC CULT: ABNORMAL
GRAM STN SPEC: ABNORMAL
GRAM STN SPEC: ABNORMAL
SERVICE CMNT-IMP: ABNORMAL

## 2023-02-26 LAB
BACTERIA SPEC CULT: NORMAL
BACTERIA SPEC CULT: NORMAL
SERVICE CMNT-IMP: NORMAL
SERVICE CMNT-IMP: NORMAL

## 2023-02-28 ENCOUNTER — OFFICE VISIT (OUTPATIENT)
Dept: ENT CLINIC | Age: 43
End: 2023-02-28

## 2023-02-28 VITALS — BODY MASS INDEX: 27.29 KG/M2 | HEIGHT: 60 IN | WEIGHT: 139 LBS | RESPIRATION RATE: 18 BRPM

## 2023-02-28 DIAGNOSIS — H70.012: Primary | ICD-10-CM

## 2023-02-28 PROCEDURE — 99024 POSTOP FOLLOW-UP VISIT: CPT | Performed by: OTOLARYNGOLOGY

## 2023-02-28 ASSESSMENT — ENCOUNTER SYMPTOMS
SHORTNESS OF BREATH: 0
ABDOMINAL PAIN: 0

## 2023-02-28 NOTE — PROGRESS NOTES
Chief Complaint   Patient presents with    Follow-up     Patient is here for her ER follow up on her left ear and states that it is no pain. HPI:  Salma Gonzales is a 43 y.o. female seen today in follow-up after she underwent I&D of left mastoid subperiosteal abscess 1 week ago. She was discharged on  and feels so much better today. There is no further ear pain and her hearing is improved dramatically as well. The drain site has fully healed and she has several days of antibiotics remaining. Past Medical History, Past Surgical History, Family history, Social History, and Medications were all reviewed with the patient today and updated as necessary. No Known Allergies  Patient Active Problem List   Diagnosis    Acute mastoiditis of left side    Iron deficiency anemia secondary to inadequate dietary iron intake     Current Outpatient Medications   Medication Sig    amoxicillin-clavulanate (AUGMENTIN) 875-125 MG per tablet Take 1 tablet by mouth every 12 hours for 18 doses    ferrous sulfate (IRON 325) 325 (65 Fe) MG tablet Take 1 tablet by mouth 2 times daily    traMADol (ULTRAM) 50 MG tablet Take 1 tablet by mouth every 6 hours as needed for Pain for up to 5 days. Intended supply: 3 days. Take lowest dose possible to manage pain Max Daily Amount: 200 mg     No current facility-administered medications for this visit. History reviewed. No pertinent past medical history. Social History     Tobacco Use    Smoking status: Not on file    Smokeless tobacco: Not on file   Substance Use Topics    Alcohol use: Not on file     Past Surgical History:   Procedure Laterality Date     SECTION      EAR SURGERY Left 2023    I&D of L ear abscess, myringotomy    NECK SURGERY Left 2023    INCISION AND DRAINAGE LEFT MASTOID ABCESS/ POSS MYRINGOTOMY TUBES performed by Constance Russell MD at [address] [place] [address]       No family history on file.      ROS:    Review of Systems Constitutional:  Negative for fever. Eyes:  Negative for visual disturbance. Respiratory:  Negative for shortness of breath. Cardiovascular:  Negative for chest pain. Gastrointestinal:  Negative for abdominal pain. Skin:  Negative for rash. Neurological:  Negative for dizziness and headaches. Hematological:  Negative for adenopathy. Psychiatric/Behavioral:  Negative for agitation. PHYSICAL EXAM:    Resp 18   Ht 5' (1.524 m)   Wt 139 lb (63 kg)   BMI 27.15 kg/m²     General: NAD, well-appearing  Neuro: No gross neuro deficits. CN's II-XII intact. No facial weakness. Eyes: EOMI. Pupils reactive. No periorbital edema/ecchymosis. No nystagmus. Skin: No facial erythema, rashes or concerning lesions. Nose: No external deviations or saddling. Intranasally, septum is midline without perforations, nasal mucosa appears healthy with no erythema, mucopurulence, or polyps. Mouth: Moist mucus membranes, normal tongue/palate mobility, no concerning mucosal lesions. Oropharynx clear with no erythema/exudate, no tonsillar hypertrophy. Ears: Healing left postauricular incision with several residual fast gut sutures in place. No further edema. Drain site fully healed. Left EAC is clear with no further Gelfoam and TM is intact with no effusion. Neck: Soft, supple, no palpable lateral neck masses. No palpable parotid or submandibular masses. No thyromegaly or palpable thyroid nodules. No surgical scars. Lymphatics: No palpable cervical LAD. Resp: No audible stridor or wheezing. CV: No murmurs, no JVD. Extremities: No clubbing or cyanosis. PATH:  DIAGNOSIS        \"LEFT EAR MASS\":  ACUTE AND CHRONIC INFLAMMATION, CONSISTENT WITH THE   HISTORY OF ABSCESS. CULTURE:   LIGHT STREPTOCOCCUS PNEUMONIAE Abnormal        ASSESSMENT and PLAN      ICD-10-CM    1. Subperiosteal abscess of mastoid, left ear  H70.012         Doing much better after I&D of her left mastoid subperiosteal abscess last week. The culture grew out strep pneumo and her biopsy revealed only inflammation, and no evidence of malignancy. Her EAC is clear with no further Gelfoam and the drum is intact and her hearing has improved significantly. I reviewed her wound care instructions and she will complete the antibiotics and call me if her symptoms recur.       Matias Lemos MD  2/28/2023    Electronically signed by Matias Lemos MD on 2/28/2023 at 11:13 AM

## (undated) DEVICE — SUREFIT, DUAL DISPERSIVE ELECTRODE, CONTACT QUALITY MONITOR: Brand: SUREFIT

## (undated) DEVICE — CORD ES L12FT BPLR FRCP

## (undated) DEVICE — DRAIN SURG PENROSE 0.25X12 IN CLOSED WND DRAINAGE PREM SIL

## (undated) DEVICE — CANISTER, RIGID, 2000CC: Brand: MEDLINE INDUSTRIES, INC.

## (undated) DEVICE — SWAB SPEC COLLCTN 6 IN SINGLE RAYON TIP STRL STARSWAB II

## (undated) DEVICE — BANDAGE,GAUZE,CONFORMING,3"X75",STRL,LF: Brand: MEDLINE

## (undated) DEVICE — SPONGE GZ W6XL6IN COT 6 PLY SUP FLUF EXTRA ABSRB FOR PRE OP

## (undated) DEVICE — BANDAGE,GAUZE,BULKEE II,4.5"X4.1YD,STRL: Brand: MEDLINE

## (undated) DEVICE — BLADE ES ELASTOMERIC COAT INSUL DURABLE BEND UPTO 90DEG

## (undated) DEVICE — ELECTRODE PT RET AD L9FT HI MOIST COND ADH HYDRGEL CORDED

## (undated) DEVICE — KIT PROCEDURE SURG HEAD AND NECK TOTE

## (undated) DEVICE — GLOVE ORANGE PI 7 1/2   MSG9075

## (undated) DEVICE — SOLUTION IRRIG 1000ML 0.9% SOD CHL USP POUR PLAS BTL

## (undated) DEVICE — SURGIFOAM SPNG SZ 12-7

## (undated) DEVICE — PREMIUM WET SKIN PREP TRAY: Brand: MEDLINE INDUSTRIES, INC.